# Patient Record
Sex: FEMALE | Race: WHITE | NOT HISPANIC OR LATINO | URBAN - METROPOLITAN AREA
[De-identification: names, ages, dates, MRNs, and addresses within clinical notes are randomized per-mention and may not be internally consistent; named-entity substitution may affect disease eponyms.]

---

## 2019-06-07 ENCOUNTER — OFFICE VISIT (OUTPATIENT)
Dept: INTEGRATIVE MEDICINE | Age: 54
End: 2019-06-07

## 2019-06-07 VITALS
SYSTOLIC BLOOD PRESSURE: 112 MMHG | TEMPERATURE: 98.1 F | HEIGHT: 69 IN | OXYGEN SATURATION: 97 % | HEART RATE: 66 BPM | RESPIRATION RATE: 20 BRPM | WEIGHT: 203.6 LBS | DIASTOLIC BLOOD PRESSURE: 72 MMHG | BODY MASS INDEX: 30.16 KG/M2

## 2019-06-07 DIAGNOSIS — J45.40 MODERATE PERSISTENT ASTHMA WITHOUT COMPLICATION: Primary | ICD-10-CM

## 2019-06-07 PROCEDURE — INTG102 PR NEW WELLNESS ASSESSMENT (60 MIN): Performed by: FAMILY MEDICINE

## 2019-06-07 ASSESSMENT — ENCOUNTER SYMPTOMS
CONSTIPATION: 1
SLEEP DISTURBANCE: 0
HALLUCINATIONS: 0
HEADACHES: 0
PHOTOPHOBIA: 0
DECREASED CONCENTRATION: 0
LIGHT-HEADEDNESS: 0
ALLERGIC/IMMUNOLOGIC NEGATIVE: 1
SEIZURES: 0
DIFFICULTY URINATING: 0
RHINORRHEA: 1
TREMORS: 0
BLOOD IN STOOL: 0
CHILLS: 0
DIARRHEA: 0
FACIAL SWELLING: 0
ARTHRALGIAS: 0
COLOR CHANGE: 0
COUGH: 1
SINUS PRESSURE: 1
CONFUSION: 0
FATIGUE: 0
DIZZINESS: 0
NUMBNESS: 0
EYE PAIN: 0
TROUBLE SWALLOWING: 0
RECTAL PAIN: 0
WHEEZING: 1
BACK PAIN: 0
ABDOMINAL DISTENTION: 1
DIAPHORESIS: 0
FLANK PAIN: 0
ADENOPATHY: 0
NAUSEA: 0
BRUISES/BLEEDS EASILY: 0
ANAL BLEEDING: 0
ABDOMINAL PAIN: 0
VOMITING: 0
FEVER: 0
DYSURIA: 0

## 2019-06-07 NOTE — PROGRESS NOTES
"[facility] Wellness Visit    Date: 2019    Name: Magaly Aranda    : 1965    Reason for visit: Wellness    Allergies: Patient has no known allergies.    Medications: albuterol inhaler, zyrtec prn, flonase NS prn    Supplements: Off most now      PLEASE SEE THE ATTACHED FAMILY HX, SOCIAL HISTORY, AND INTEGRATIVE HISTORY EVALUATIONS (MSQ, TIMELINE, MATRIX, OTHER HISTORY FORMS) IN PAPER FORM ATTACHED TO THE PATIENT’S CHART.      Family History:   Family History   Problem Relation Age of Onset   • Hypertension Mother    • Alzheimer's disease Mother    • Diabetes Father    • Hepatitis Father    • Hypertension Father          Social History:   Social History     Social History   • Marital status:      Spouse name: N/A   • Number of children: N/A   • Years of education: N/A     Social History Main Topics   • Smoking status: Former Smoker     Years: 10.00   • Smokeless tobacco: Never Used      Comment: QUIT    • Alcohol use Yes      Comment: MODERATE   • Drug use: No   • Sexual activity: Not Asked     Other Topics Concern   • None     Social History Narrative   • None        Experiences with CAM:[]      Personal History:[]        Lifestyle:[]      Diet and Nutrition:[]        Vitals: /72 (BP Location: Left upper arm, Patient Position: Sitting)   Pulse 66   Temp 36.7 °C (98.1 °F) (Oral)   Resp 20   Ht 1.753 m (5' 9\")   Wt 92.4 kg (203 lb 9.6 oz)   SpO2 97%   BMI 30.07 kg/m²     Weight: Weight: 92.4 kg (203 lb 9.6 oz)     Height: Height: 175.3 cm (5' 9\")     BMI:Body mass index is 30.07 kg/m².    MARY:     % Body Fat: 42.2    % Muscle Mass: 64.8    Visceral Fat:  20    WC: 37.25    NSR 62/min      HPI  For first visit in 2 - 3 years  Had pneumonia in February (+ CXR)  Recurrent sinus infections and Asthma    Working renovating houses to flip them  Weight up at 203, 209 7 years ago.     Fatigue, tired.  Sleep is poor, 3 AM up and it takes a while to go " back to sleep.  With the past business psycho-partner, a 1 1/2 year extremely stressful time that had her angry and fatigued all the time.  Got bad again after the deal was finally done, like a release of the pressure actually made her collapse.   Vacation last week and was great, and then after it was over, then sick right after.    Gets cold sx, then sinuses get bad, then in the chest.  Dry feeling. Albuterol used intermittently.    Saline nose spray.  Has the Flonase but it seems like she gets sick.  Not used prophyllactically    3 mo ago last Rx with ZPAK.  Now coming again[place]  Then the allergies came back.  Was off the Zyrtec.  Had the bad itch when stopped it after taking it for a year.    Congested right up when got home.  Roof and Basement, have had water damage in the past, old bldg, they completed renovated.     Sis and her still fight.  On a break from her house flipping now.     Trying Yoga.  Feet pains, new foot doc.  Right foot bunion, had 3 surgeries on the left foot, toe implant and bunion removal.  Trouble with prolonged walking.   No other exercise, gym lately.   Not exercising.  Tires her out more then she thinks, has to nap after the gym.      Zyrtec, claritin    Bowels also:  Magnesium, no constipation exc might if she would.  Carbs will cause constipation.  Brown rice, sweet potato.  Bloating and Gas.  No diarrhea.  Feet swollen and     For at least 10 - 20 years, would get bronchitis every august even as a kid, also had allergioes then.  Got worse with allergy shots.    Environmental, horses.  Not dogs?  Had Cats as an adult, had reactions.     Rice, Dairy cause bloating sometimes, tends to run constipated.  Has done HCG diet several times and always loses weight,  Vegetables no problem?  Diet dairy and higher fiber carbs, starchy.  Little bread, sourdough.  Pancake bothered her last week.  Soy and corn skinny pop bothers her. Did the diet in Jan and lost 17 lbs, gained back  14 now.    Off all HRT for 3 months, on Testosterone cream low dose for energy and mild clarity.  Brain fog got way worse but improving this month.  No night sweats.      Last period was in March stopped the HRT, then progesterone for another month.  Saw GYN, had an US with fibroid. Used to be regular before the HRT, PMS was a little.    PAP exam in last year.   Pellet testosterone low dose.      On BodyLog[employer] MD Stress formula has adrenal extract in it.   Melatonin and L theanine. One at night, not benadryl.  Magnesium at night.   Lyme was negative in the past.  Aching and fatigu often.  HA rare.  Wired, neck pain also.  Tight chest at times. Thighs get bad after exercise    Massage once in a while      Review of Systems   Constitutional: Negative for chills, diaphoresis, fatigue and fever.   HENT: Positive for congestion, postnasal drip, rhinorrhea and sinus pressure. Negative for dental problem, ear pain, facial swelling, hearing loss, mouth sores, nosebleeds, tinnitus and trouble swallowing.    Eyes: Negative for photophobia, pain and visual disturbance.   Respiratory: Positive for cough and wheezing (Intermittent with bad allergy, exposures or URI).    Cardiovascular: Positive for chest pain (tightness at times, not related to exertion) and leg swelling (Comes and goes with travel, or sometimes after eating carbs).   Gastrointestinal: Positive for abdominal distention (Bloats and gas bad after certain carbs) and constipation. Negative for abdominal pain, anal bleeding, blood in stool, diarrhea, nausea, rectal pain and vomiting.   Endocrine: Negative for cold intolerance and heat intolerance.   Genitourinary: Negative for difficulty urinating, dyspareunia, dysuria, enuresis, flank pain, menstrual problem, pelvic pain and vaginal bleeding.   Musculoskeletal: Positive for gait problem (Due to left in the past, and now right foot pain/bunion). Negative for arthralgias and back pain.   Skin: Negative for color  change, pallor and rash.   Allergic/Immunologic: Negative.    Neurological: Negative for dizziness, tremors, seizures, syncope, light-headedness, numbness and headaches.   Hematological: Negative for adenopathy. Does not bruise/bleed easily.   Psychiatric/Behavioral: Negative for confusion, decreased concentration, hallucinations and sleep disturbance.         Physical Exam   Constitutional: She is oriented to person, place, and time. She appears well-developed and well-nourished.   HENT:   Head: Normocephalic.   Right Ear: External ear normal.   Left Ear: External ear normal.   Nose: Nose normal.   Mouth/Throat: Uvula is midline and mucous membranes are normal. No oral lesions. No posterior oropharyngeal erythema.   Bad of throat slightly red and white post pharynx drainage   Eyes: Conjunctivae and EOM are normal.   Neck: Normal range of motion. No thyromegaly present.   Tight posterior cervical muscles.    Cardiovascular: Normal rate, regular rhythm, normal heart sounds and intact distal pulses.    No murmur heard.  Pulmonary/Chest: Effort normal and breath sounds normal. She has no rales.   Abdominal: Soft. Bowel sounds are normal. She exhibits no distension and no mass. There is no tenderness. There is no rebound.   Musculoskeletal: Normal range of motion. She exhibits no edema or deformity.   Lymphadenopathy:     She has no cervical adenopathy.   Neurological: She is alert and oriented to person, place, and time. No cranial nerve deficit.   Skin: Skin is warm and dry. No rash noted. No pallor.   Psychiatric: She has a normal mood and affect. Her speech is normal and behavior is normal. Judgment and thought content normal.         Other:    Labs:   MSQ Score: []    Exercise Questionnaire:[]    Nutrition Journal:[]    Self Care Questions:[]      ASSESSMENT: []  Menopause  Recurrent sinus infections  Environmental allergies  Asthma, moderate  Hx of pneumonia 10/2018  Fatigue  Insomnia  Overweight  Myalgias  Joint  and neck pains  Work exposure to dust, fumes, chemicals, paint, mold  HRT with testosterone pellets  Most likely dysbiosis with altered micro-biome and increased intestinal permeability due to multiple antibiotics, steroids, over the last 20 years.    Food intolerances due to the dysbiosis, rule out celiac.  Viral URI    PLAN:[]  Labs ordered, need to do full assessment of her hormonal systems, nutrients, autoimmune status, possible food allergies, also lipids, sugar, insulin, hemoglobin A1c.  We will do a full thyroid panel including iodine.  Check her ferritin and CBC, check CPK to rule out myopathy.  Mercury, lead, and arsenic due to occupational exposures.  HLA-DR and MMP 9 because of concerns about mold toxicity.  Assess rest of adrenal function with a renin and aldosterone due to weakness and lightheadedness with probable low blood pressure at times.  Elimination diet as an intervention, will begin 7 food elimination, reviewed the diet, food list given, other materials pointed out on her website, to do a 4-week elimination, which she is used to doing the hCG diet.  However we will do a formal rechallenge after 4 weeks, 2 days on and 2 days off each food group, materials given and reviewed including symptom chart.  Also consider low FODMAPs approach.    After the elimination phase, will begin a detox program with Lynd[employer] detox products and modified citrus pectin.  Consider [facility] lab urine toxin panel.    To download and do the visual contrast sensitivity test to screen for mold toxicity at her home.  We will discuss further work-up and evaluation based on HLA-DR and MMP 9 results.  Lyme and tickborne infection screening tests also ordered.  We will monitor weight, endurance, muscle and neck pains.  Encourage stretching, yoga, massage.    Follow-up with gynecologist and bioidentical hormone specialist.  Consider genomic testing for estrogen metabolism, consider nutritional means of increasing aromatase  activity now that she is menopausal, consider calcium glucurate.  Consider DHEA and pregnenolone use..     Discussed the altered micro-biome after multiple antibiotics for decades.  Discussed her food intolerances.  Stool tests for doctor's data comprehensive stool exam with parasitology x1 ordered.  Reviewed the specimen collection, and kit given.  Consider IgG4 to further evaluate possible foods that may trigger her allergy and asthma symptoms.  Consider new supplement package to include multivitamins, adaptive genic herbs, quercetin and herbs for allergy and asthma, and then the detoxification package from [employer].      Given Rx for ZPAK to use if sinus sx worsen  RTO:   [] We will call with results and schedule follow-up.

## 2019-06-26 DIAGNOSIS — Z77.120 CONTACT WITH MOLD: Primary | ICD-10-CM

## 2019-06-26 DIAGNOSIS — J45.40 MODERATE PERSISTENT ASTHMA WITHOUT COMPLICATION: ICD-10-CM

## 2019-06-26 RX ORDER — CHOLESTYRAMINE 4 G/9G
POWDER, FOR SUSPENSION ORAL
Qty: 60 PACKET | Refills: 6 | Status: SHIPPED | OUTPATIENT
Start: 2019-06-26 | End: 2019-06-26 | Stop reason: SDUPTHER

## 2019-06-26 NOTE — PROGRESS NOTES
Telephone call: Discussed with Samara and all of her lab results.  Highlights are below:    Vitamin D 28.7, started vitamin D drops 5000 units a day.    Testosterone level 107, DHEA reduced at 87, pregnenolone significantly reduced at 17, normal sex hormone binding globulin at 71, prolactin at 10.2, estradiol at 44.8, ALT postmenopausal.  Patient is on testosterone pellets, and dose was decreased when it was inserted in beginning of April.  She has a follow-up in July.  I recommend that she decrease the dose of testosterone again.  In the meantime, suggest she begin pregnenolone 50 mg 1 a day, brand names given.  Recheck levels in about 3 months.    CBC, CMP, normal, with hemoglobin A1c 5.3%, insulin 6.7.    ASHOK, ASHOK cascade, rheumatoid labs all negative.    Arsenic, mercury negative, lead level was 2.    Coenzyme Q 10, vitamin A, glutathione all normal, cortisol level was 6.3 at the bottom of normal.  CPK was normal at 60.  Copper was 99, with zinc of 74, with a relative suboptimal zinc level.  She will begin zinc at a 15 to 20 mg dose of over-the-counter zinc.    Lyme antibodies and C6 Lyme peptide, as well as all usual tickborne screening tests were negative.    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    Thyroid labs including reverse T3 were unremarkable.  Urine iodine was normal.  However, TPO antibodies are now elevated at 59.  Suggested that she begin thyroid synergy by designs for health, 2 a day.    Aldosterone to rennin ratio was normal.  Suggest that she decrease her In Body adrenal glandular supplement down to only 1 a day.  That also has Rhodiola in it at 100 mg dose.  The thyroid synergy has American ginseng.    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  MMP 9 was elevated at 463.  She had her basement which has had significant water damage, looked at, and she has significant mold problems.  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  Since it looks like she has susceptibility and signs of toxicity, she is going to proceed with doing a remediation by professional at home.  In the meantime, she may try cholestyramine powder, to do only one half of a packet in water twice a day about 30 to 45 minutes before lunch and dinner.  She will take all her other supplements around breakfast and in the evening.  Prescription called in.    To let me know in about 1 month how she has been doing with the supplements.  Plan on rechecking blood tests including an expanded mold profile in about 3 months.  She will proceed with a visual contrast sensitivity test now and let me know the results.  After remediation occurs in July, she should do home testing with a HERTSMI-2 about 1 month after completion of the project.    Dr. ZIMMERMAN

## 2019-06-27 RX ORDER — CHOLESTYRAMINE 4 G/9G
POWDER, FOR SUSPENSION ORAL
Qty: 180 PACKET | Refills: 3 | Status: SHIPPED | OUTPATIENT
Start: 2019-06-27 | End: 2020-11-09

## 2019-08-14 ENCOUNTER — OFFICE VISIT (OUTPATIENT)
Dept: INTEGRATIVE MEDICINE | Age: 54
End: 2019-08-14

## 2019-08-14 VITALS
TEMPERATURE: 98 F | HEART RATE: 67 BPM | HEIGHT: 69 IN | OXYGEN SATURATION: 98 % | WEIGHT: 202.6 LBS | SYSTOLIC BLOOD PRESSURE: 118 MMHG | RESPIRATION RATE: 18 BRPM | DIASTOLIC BLOOD PRESSURE: 64 MMHG | BODY MASS INDEX: 30.01 KG/M2

## 2019-08-14 DIAGNOSIS — J45.40 MODERATE PERSISTENT ASTHMA WITHOUT COMPLICATION: Primary | ICD-10-CM

## 2019-08-14 DIAGNOSIS — R10.84 GENERALIZED ABDOMINAL PAIN: ICD-10-CM

## 2019-08-14 PROCEDURE — INTG103 PR FOLLOW-UP CONSULT (60 MIN): Performed by: FAMILY MEDICINE

## 2019-08-14 PROCEDURE — INTG114 PR NEW CONSULT LAB PREP FEE: Performed by: FAMILY MEDICINE

## 2019-08-14 ASSESSMENT — ENCOUNTER SYMPTOMS
FATIGUE: 0
SHORTNESS OF BREATH: 0
ACTIVITY CHANGE: 0
RHINORRHEA: 1
HALLUCINATIONS: 0
COLOR CHANGE: 0
VOMITING: 0
CHEST TIGHTNESS: 0
PALPITATIONS: 0
SLEEP DISTURBANCE: 0
CONFUSION: 0
DIARRHEA: 1
FEVER: 0
HEMATURIA: 0
DIZZINESS: 0
HEADACHES: 1
APPETITE CHANGE: 0
BLOOD IN STOOL: 0
ADENOPATHY: 0
JOINT SWELLING: 0
UNEXPECTED WEIGHT CHANGE: 0
AGITATION: 0
ARTHRALGIAS: 0

## 2019-08-14 NOTE — PROGRESS NOTES
"[facility] Follow Up Visit    Date: 2019    Name: Magaly Aranda    : 1965    Reason for visit:     Allergies: Patient has no known allergies.    Medications:   Current Outpatient Prescriptions   Medication Sig Dispense Refill   • ASHWAGANDHA ROOT EXTRACT,BULK, MISC 1,950 mg. Physicians choice     • cholestyramine (QUESTRAN) 4 gram packet Take 1/2 a packet (2 gm) dissolved in 6 ounces of water about 30 - 45 minutes before lunch and dinner. 180 packet 3   • diphenhydramine HCl (SLEEP ORAL) Take by mouth. Sleep gummies. Natures bounty     • GINKGO BILOBA ORAL Take by mouth. Brand- havasu     • MAGNESIUM ORAL Take 500 mg by mouth. Finest nutrition.     • multivit-minerals/folic acid (ADULT MULTIVITAMIN GUMMIES ORAL) Take by mouth. Smarty pants womens complete     • NOT IN DATABASE Pure lift. Body logic md      • NOT IN DATABASE hsot defense, mushrooms. Cordyceps. Energy support     • NOT IN DATABASE Blood circulation. Thrive     • TURMERIC ORAL Take 500 mg by mouth. Nature made       No current facility-administered medications for this visit.        Supplements: [ ]    Family History:   Family History   Problem Relation Age of Onset   • Hypertension Mother    • Alzheimer's disease Mother    • Diabetes Father    • Hepatitis Father    • Hypertension Father          Social History:   Social History     Social History   • Marital status:      Spouse name: N/A   • Number of children: N/A   • Years of education: N/A     Social History Main Topics   • Smoking status: Former Smoker     Years: 10.00   • Smokeless tobacco: Never Used      Comment: QUIT    • Alcohol use Yes      Comment: MODERATE   • Drug use: No   • Sexual activity: Not Asked     Other Topics Concern   • None     Social History Narrative   • None        Vitals: /64 (BP Location: Left upper arm, Patient Position: Sitting)   Pulse 67   Temp 36.7 °C (98 °F) (Oral)   Resp 18   Ht 1.753 m (5' 9\") " "  Wt 91.9 kg (202 lb 9.6 oz)   SpO2 98%   BMI 29.92 kg/m²     Weight: Weight: 91.9 kg (202 lb 9.6 oz)     Height: Height: 175.3 cm (5' 9\")     BMI:Body mass index is 29.92 kg/m².    MARY: [ ]    % Body Fat: [ ]    % Muscle Mass: [ ]    Visceral fat:  []    WC: [ ]  Sinus Jose 57/min      HPI  Returns for Fu after initial bowel and mold treament, along with mold remediation at home. Wt down 2 lbs. Did elimination diet for 6 weeks, had bad member guest weekend, alcohol on weekends.     Gi fortify with almond milk.  Arabinogalactan in the pills.  Sacchromyces one bid.   Pancreatin and oxbile by vital nturients, gets bad burning, one with lunch and dinner.      Cholestyramine has to make herself do it.  Two out this morning.  One packet, once a day sometimes.    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      Cramping and loose for 4 - 5 days, then did for 1 - 2 days in a row.  Happened last week and 2 weeks ago.    Elimination:  staying off gluten, dairy, restarted this week.    No GERD (years ago only)  Occ constipation after carbs.      Sinus issues if around certain things, smell set her off.  Congestion.   Sneezing.  One day took zyrtec, took apart the sofa and was bad that day.  Going to sit.    Good stretch    Sleep is 1/2 night, wake up and gets back most of the time.    Fatigue is in the legs when goes up the steps.  Tired end of the day and feet hurt.  About the same, off and on.   Taking the naproxen one on days golfing. No second dose usually.      Feets a little less achy.  When very busy gets achy at end of the day.      Not working on a new house.     At home:  Tear up the basement, work completed, put up new wwall system.   Bedroom ceiling leaking into the roof and ceiling.  Their neighbors was rotten.  Blown in insulation.  Ripped it all out and drying out now.  Last week.  Tried not to be home.  Neighbors may fix theirs.     New slate system.  "     Abbe's room had leaking chimney.  Plaster ok but wood was roteen.  Sprayed with the mold deterent.     Failed one eye.      Did petri dish from outside mold,  .  Obvious mold on the wood.      Swelling in the morning when she gets up.    On testosterone replaacement.    Bloating has changed for the good.   Zuccini and bean pasta.    Green tea, not much coffee.  Staying off corn due to mold  Congested some sleeping in the loft.  Allergies usually.  Lot sof renovating still in progress.    Fuse is longer.  Trying to breath or meditate some, not over reacting.     Off the ashwagandha.  Stop ginko. Smarty pants MVI for women.  No iron.  Magnesium two at night.  No turmeric  Zinc at night  Host defense Mushroom  Benadryl not at night.   Sleep formula jelly Melatonin, and L theanine.  one at night.    5 htp and melatonin.      JJ Virgin diet.  Wondering about other food allergens.   Using the saline squirts up the nose.     Sleep   Easy fatigue  Aching  Weight    Mood improving.   Bowels improving    Has  HEPA vac.   Demudifier massive unit 54%  HEPA on the furnace, HVAC coing today.  Cleaned the ducts.  Disinfected the ducts.     Only wheezed a little after sleeping in the loft yesterday.       Review of Systems   Constitutional: Negative for activity change, appetite change, fatigue, fever and unexpected weight change.   HENT: Positive for rhinorrhea (Much less then usual this month).    Respiratory: Negative for chest tightness and shortness of breath.    Cardiovascular: Negative for chest pain, palpitations and leg swelling.   Gastrointestinal: Positive for diarrhea (Loose bm one to two times last week and this week.   constipation is gone, bloating is gone. ). Negative for blood in stool and vomiting.   Endocrine: Positive for polyuria.        Often feels swollen   Genitourinary: Negative for hematuria.   Musculoskeletal: Negative for arthralgias and joint swelling.   Skin: Negative for color change, pallor and  rash.   Allergic/Immunologic: Positive for environmental allergies and food allergies.   Neurological: Positive for headaches (improved now). Negative for dizziness and syncope.   Hematological: Negative for adenopathy.   Psychiatric/Behavioral: Negative for agitation, confusion, hallucinations and sleep disturbance.        Less angry, less irritable, more relaxed.          Physical Exam   Constitutional: She is oriented to person, place, and time. She appears well-developed and well-nourished. No distress.   HENT:   Head: Normocephalic.   Nose: Nose normal.   Mouth/Throat: Oropharynx is clear and moist.   Eyes: Conjunctivae and EOM are normal. No scleral icterus.   Neck: Normal range of motion. No thyromegaly present.   Cardiovascular: Normal rate, regular rhythm, normal heart sounds and intact distal pulses.    No murmur heard.  Pulmonary/Chest: Effort normal and breath sounds normal.   Lymphadenopathy:     She has no cervical adenopathy.   Neurological: She is alert and oriented to person, place, and time. No cranial nerve deficit.   Skin: Skin is warm and dry. No rash noted. No pallor.   Psychiatric: She has a normal mood and affect. Her behavior is normal.   Nursing note and vitals reviewed.        Other:    Labs:   MSQ Score: [ ]        ASSESSMENT: [ ]  1. Mold toxicity:  HLA genotype positive, MMP 9 was 453, VCS failed, Multiple areas with water damage and mold at home (basement, bedroom)    2. dysbiosis with depressed beneficial bacteria, low elastase, low sIgA, impaired butyrate (multiple antibiotics for years) with bloating and contstipation on carbs.  3. Improving on no grains exc rice diet, excluding high mold foods like corn, peanuts, coffee, gluten.  Restarting dairy, shellfish and eggs.  Off soy.   4. Obesity resistant, due to mold toxicity, dysbiosis, altered stress response  5. Hashimoto's thyroiditis, tpo 59  6. Testosterone replacement  7. Depressed dhea and pregnenolone, menopausal/mold  toxin  8. Environmental allergies and asthma, improved  9. Other food allergy?  10. Toe joint replacement  11. Recurrent sinusitis  12. FHx of alzheimer, Mild cognitive impairment  13. Vit D deficiency  14. Lower then ideal zinc.   15. Insomnia  16. HLA DQ2 gene positive, with negative celiac ab screen  17. Altered suppressed cortisol circadian pattern last year on ZRT 4 pt saliva test.   18. Menopause  19. 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 Aching. Fatigue, mood swings, obesity due to mold toxins    PLAN:[ ]  1. Continue to complete the renovation at home, getting furnace cleaned out.  Doing all belongings, guidelines given  2. Check for MARCONs now, culture taken today, for Fungi also  3. Check Allergix 87 today for other food reactions  4. Continue the bowel protocol.  GI fortify, arabinogalactan (DC if increased loose BM's, sacchromyces b, digestive enzymes and ox bile (no HCL).    5. Continue regular activity, golf.   6. Continue to take more time for relaxation.  Consdier breathwork, yoga, meditaion, heartmath.   7. Remain off gluten and GF grains exc rice.  Remain off corn, peanuts and coffee.  Doing green tea.   OK to reintroduce the shellfish, eggs and dairy for now.   8. Continue the Zinc 30 mg, Vit D, MVI, Thyroid synergy 2 a day  9. Recheck VCS online in 2 weeks to track progress  10. In one month, check rest of the Mold markers, full thyroid panel, dhea and pregnenolone plus testosterone 9adjust dose?)  11. Treat MACONs if positive.   12. Do HERTSMI-2 in 1 mo after completion of renovations/repairs      RTO:   [ ] 2 months, redo MSQ and MARY

## 2019-08-21 NOTE — PROGRESS NOTES
Started ZACH nosespray for RUDY, ordering at [facility] in [place].  2 squirts each nostril bid.  Can use with one squirt of Xlear OTC .  uSE FOR one month.  Recheck markers in one month.

## 2019-09-16 ENCOUNTER — TELEPHONE (OUTPATIENT)
Dept: INTEGRATIVE MEDICINE | Age: 54
End: 2019-09-16

## 2019-11-01 LAB
25(OH)D3+25(OH)D2 SERPL-MCNC: 32.7 NG/ML (ref 30–100)
CRP SERPL-MCNC: 1 MG/L (ref 0–10)
DHEA-S SERPL-MCNC: 71.5 UG/DL (ref 41.2–243.7)
OSMOLALITY SERPL: 289 MOSMOL/KG (ref 275–295)
T3 SERPL-MCNC: 105 NG/DL (ref 71–180)
T3FREE SERPL-MCNC: 3.1 PG/ML (ref 2–4.4)
T4 FREE SERPL-MCNC: 1.4 NG/DL (ref 0.82–1.77)
TESTOST FREE SERPL-MCNC: 2 PG/ML (ref 0–4.2)
TESTOST SERPL-MCNC: 99 NG/DL (ref 3–41)
THYROPEROXIDASE AB SERPL-ACNC: 50 IU/ML (ref 0–34)
TSH SERPL DL<=0.005 MIU/L-ACNC: 1.36 UIU/ML (ref 0.45–4.5)

## 2019-11-02 LAB — ZINC SERPL-MCNC: 143 UG/DL (ref 56–134)

## 2019-11-04 LAB
PREG SERPL-MCNC: 29 NG/DL
T3REVERSE SERPL-MCNC: 16.8 NG/DL (ref 9.2–24.1)

## 2019-11-07 LAB
A-MSH SERPL-MCNC: <8 PG/ML (ref 0–40)
ACTH PLAS-MCNC: 20.1 PG/ML (ref 7.2–63.3)
LAB CORP TRANS. GROWTH FACT. BETA 1: 2801 PG/ML (ref 867–6662)
LEPTIN SERPL-MCNC: 32.7 NG/ML
VASOPRESSIN SERPL-MCNC: 0.8 PG/ML (ref 0–4.7)
VEGF SERPL-MCNC: 34 PG/ML (ref 0–115)

## 2019-11-09 LAB — MMP9 SER-MCNC: 373 NG/ML

## 2019-12-06 NOTE — PROGRESS NOTES
[facility] Follow Up Visit    Date: 2019    Name: Magaly Aranda    : 1965    Reason for visit:     Allergies: Patient has no known allergies.    Medications:   Current Outpatient Medications   Medication Sig Dispense Refill   • cholestyramine (QUESTRAN) 4 gram packet Take 1/2 a packet (2 gm) dissolved in 6 ounces of water about 30 - 45 minutes before lunch and dinner. 180 packet 3   • diphenhydramine HCl (SLEEP ORAL) Take by mouth. Sleep gummies. Natures bounty     • HERBAL DRUGS ORAL Take by mouth. arabingolactn     • MAGNESIUM ORAL Take 500 mg by mouth. Finest nutrition.     • multivit-minerals/folic acid (ADULT MULTIVITAMIN GUMMIES ORAL) Take by mouth. Smarty pants womens complete     • NOT IN DATABASE Pure lift. Body logic md      • NOT IN DATABASE hsot defense, mushrooms. Cordyceps. Energy support     • TURMERIC ORAL Take 500 mg by mouth. Nature made     • ZINC ORAL Take by mouth.     • ASHWAGANDHA ROOT EXTRACT,BULK, MISC 1,950 mg. Physicians choice     • GINKGO BILOBA ORAL Take by mouth. Brand- havasu     • NOT IN DATABASE Blood circulation. Thrive       No current facility-administered medications for this visit.        Supplements: [ ]    Family History:   Family History   Problem Relation Age of Onset   • Hypertension Biological Mother    • Alzheimer's disease Biological Mother    • Diabetes Biological Father    • Hepatitis Biological Father    • Hypertension Biological Father          Social History:   Social History     Socioeconomic History   • Marital status:      Spouse name: None   • Number of children: None   • Years of education: None   • Highest education level: None   Occupational History   • None   Social Needs   • Financial resource strain: None   • Food insecurity:     Worry: None     Inability: None   • Transportation needs:     Medical: None     Non-medical: None   Tobacco Use   • Smoking status: Former Smoker     Years: 10.00   •  "Smokeless tobacco: Never Used   • Tobacco comment: QUIT 2009   Substance and Sexual Activity   • Alcohol use: Yes     Comment: MODERATE   • Drug use: No   • Sexual activity: None   Lifestyle   • Physical activity:     Days per week: None     Minutes per session: None   • Stress: None   Relationships   • Social connections:     Talks on phone: None     Gets together: None     Attends Zoroastrianism service: None     Active member of club or organization: None     Attends meetings of clubs or organizations: None     Relationship status: None   • Intimate partner violence:     Fear of current or ex partner: None     Emotionally abused: None     Physically abused: None     Forced sexual activity: None   Other Topics Concern   • None   Social History Narrative   • None        Vitals:   Visit Vitals  BP 98/60 (BP Location: Left upper arm, Patient Position: Sitting)   Pulse (!) 55   Temp 36.6 °C (97.8 °F) (Oral)   Resp 16   Ht 1.74 m (5' 8.5\")   Wt 93.4 kg (206 lb)   SpO2 96%   BMI 30.87 kg/m²       Weight: Weight: 93.4 kg (206 lb)     Height: Height: 174 cm (5' 8.5\")     BMI:Body mass index is 30.87 kg/m².    MARY:     % Body Fat: 43.7    % Muscle Mass: 64.2    Visceral fat:  20    WC: [ ]    NSR: 57, sinus bradycardia      HPI  For return visit afte 8/14 visit, and treatment of MARCONS AND MOLD IN THE MEANTIME.     Wt up 4 lbs  Not panicking this weak, mood is more stale.  Not the dizzy feeling.  No skipped beats now.     Once caught the skipped beats her whole life since the 20's, on the Apple watch about 3 weeks ago. Skipped beats but not fast.     Nose stuffy with certain exposures, like paint.   More aware of response things now cause not in it every day like she was.      testosterone pellets put in July 2019, Jan to reinsert every 6 months. Lower dose.    Another lab at that time. To do   Sat no stress.     MVI six gummies daily, smarty pants  Memory fluctuating.  Celery juicing 1 week    1 " rhodiola  arabinogalactan  sacchromyces yamilka :  Probiotic at home  Gi fortify powder  Panreatin ox bile one with a meal.  Went away but took two weeks to get better  Took some pepto bismol  Ate different.    Swallows OK.  Some allergy sx, as a teen had easy choking possibly from allergy, some trouble with pills.  And no regular allergy pills.   4- 6 weeks.  Xlear  Cholestyramine one packet a day    Bowels most days, no bad constipation.  When changed magnesium.   Finest nutrition 500 mg  Mushrooms,   Out of tranquil sleep , occ some restlessness.  Not up for hours now. No melatonin.    Cut back on animal protein.  Dairy is Ok with cheese.  occ blue cheese dressing.  Not too bad.  Only occ bread.     Egg today first day. Only twice in the last  Foot shot helped 2 months ago.  Sore some again.   Left is the worst.   Left had a joint replacement.  Swelling is better.     Helps people with the houses.   Breathwork some.    Thyroid synergy 2 with food at lunch  Shake in the morning    Pain around the neck and the left ear, stayed in due to the pain?  One day then relieved.    Legs are tight and feel fatigue, not SOB.        Hot flashes  4/2019 LMP  Estrogen replacement was every 2 week periods, and then stopped the progesterone also .          Review of Systems        Physical Exam   Constitutional: She is oriented to person, place, and time. She appears well-developed and well-nourished. No distress.   HENT:   Head: Normocephalic.   Mouth/Throat: Oropharynx is clear and moist.   Eyes: Conjunctivae and EOM are normal.   Neck: Normal range of motion. No thyromegaly present.   Pulmonary/Chest: Effort normal and breath sounds normal. She has no wheezes.   Abdominal: Soft. Bowel sounds are normal.   Small umbilical hernia   Musculoskeletal: She exhibits tenderness (Tight tender points of the anterior and post neck muscles and traps. ). She exhibits no edema.   Lymphadenopathy:     She has no cervical adenopathy.    Neurological: She is alert and oriented to person, place, and time. No cranial nerve deficit.   Skin: Skin is warm and dry. No rash noted. No pallor.   Psychiatric: She has a normal mood and affect. Her behavior is normal.   Nursing note and vitals reviewed.        Other:    Labs:  pergnenolone    MSQ Score: [ ]    MoCA  Score: [ ]    ASSESSMENT:  1. Mold toxicity:  HLA genotype positive, MMP 9 was 453, VCS failed, Multiple areas with water damage and mold at home (basement, bedroom)  .  Now MMP9 down to 373, TGF B1 Nl.    2. Positive MARCONS:  S/p 6 weeks of treatment with BEG nose spray with Xlear.    3. dysbiosis with depressed beneficial bacteria, low elastase, low sIgA, impaired butyrate (multiple antibiotics for years) with bloating and contstipation on carbs, s/p 3 months of treatment with GI fortify, arabinogalactan, probiotics.    4. Improving on no grains exc rice diet,   excluding high mold foods like corn, peanuts, coffee, gluten.  Restarting dairy, shellfish and eggs.  Off soy.  Planning on reintro of some whole grains as part of Longevity diet  5. Obesity resistant, due to mold toxicity, dysbiosis, altered stress response  6. Hashimoto's thyroiditis, tpo 59 decreased to 50 with NL thyroid functions 10/31/2019  7. Testosterone replacement, level at 99 for retesting and reinsertion of pellet in Jan.  8. Depressed dhea and pregnenolone, menopausal/mold toxin  9. Environmental allergies and asthma, improved  10. Other food allergy?  11. Toe joint replacement, recurrent pain in the left and right feet  12. Recurrent sinusitis has improved and allergies have also decreased significantly  13. FHx of alzheimer, Mild cognitive impairment, more intermittent sx since mold cleanup  14. Vit D deficiency  15. Lower then ideal zinc, improved on thyroid synergy  16. Insomnia and anxiety improved  17. HLA DQ2 gene positive, with negative celiac ab screen  18. Altered suppressed cortisol circadian pattern last year on  ZRT 4 pt saliva test.   19. Menopause LMP April 2019.    20. 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 Aching. Fatigue, mood swings, obesity due to mold toxins  22. Palpitations for 20 years, most likely PAC's, no runs of tachycardia, just skipped beat feeling, last about a month ago.   23. Small umbilical hernia      PLAN:  1. [ ]Mood, anxiety, insomnia have improved  2. Weight is unchanged  3. 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   4. No menopause sx.    5. Improvement in the biomarkers for mold toxin illness.    6. Plan now that renovations are completed at home, is to wait one month and then do the VCS and the HERTSMI-2 at home.    7. Recheck RUDY today:  If present, retreat.   8. If MArCONS NEGATIVE, vcs improved, and Herstmi-2 better, and does the celery juicing and FMD diet for the next three months, and Not Better with fatigue and brain fog, wouldconsider treatment with VIP nose spray  9. Recheck biomarkers in 3 months also.  10. Will start the Fasting Mimicking Diet of CINTHYA Christine, will order the 5 day kits, fasting program 5 days a month for 3 months in a row , then every 3 - 4 months.   11. Also start inflammation resolvers:  Spm Active by [employer], 2  DAY AND THE tRANSFER fACTOR eNviro, 1 a day for 1 week then 2 a day for 3 months total.   12. Reviewed her supplements:  Cont the arabinogalactan, thyroid synergy, Xlear nose spray.    13. See list of supplements in hand out.   14. Restart strength training program. Adequate hydration  15. She is reintroducing eggs.  Also can reintro Sadaf (were positive on Allergix testing).  Doing cheese also as only dairy.   16. FU with GYN for the Teststerone dosing in Jan.        RTO:   [ ] 3 mo

## 2019-12-09 ENCOUNTER — OFFICE VISIT (OUTPATIENT)
Dept: INTEGRATIVE MEDICINE | Age: 54
End: 2019-12-09

## 2019-12-09 VITALS
BODY MASS INDEX: 30.51 KG/M2 | HEIGHT: 69 IN | TEMPERATURE: 97.8 F | RESPIRATION RATE: 16 BRPM | HEART RATE: 55 BPM | DIASTOLIC BLOOD PRESSURE: 60 MMHG | WEIGHT: 206 LBS | OXYGEN SATURATION: 96 % | SYSTOLIC BLOOD PRESSURE: 98 MMHG

## 2019-12-09 DIAGNOSIS — E06.3 HASHIMOTO'S THYROIDITIS: ICD-10-CM

## 2019-12-09 DIAGNOSIS — Z77.120 CONTACT WITH MOLD: Primary | ICD-10-CM

## 2019-12-09 DIAGNOSIS — E66.9 OBESITY (BMI 30-39.9): ICD-10-CM

## 2019-12-09 PROCEDURE — INTG103 PR FOLLOW-UP CONSULT (60 MIN): Performed by: FAMILY MEDICINE

## 2019-12-18 RX ORDER — AZITHROMYCIN 250 MG/1
TABLET, FILM COATED ORAL
Qty: 6 TABLET | Refills: 0 | Status: SHIPPED | OUTPATIENT
Start: 2019-12-18 | End: 2020-03-19

## 2019-12-19 ENCOUNTER — TELEPHONE (OUTPATIENT)
Dept: INTEGRATIVE MEDICINE | Age: 54
End: 2019-12-19

## 2019-12-19 NOTE — TELEPHONE ENCOUNTER
A envelope was received in the mail, a package search request, it is a cut out address label from our address and microbiology. I figured out it was from lynn baldwin label. She never walked into the postal service to pay for shipping she just dropped it in the box. There is no way for me to find this package because there is no tracking number and no number to call. I filled out a missing package on line.    Lynn is aware and she is sick now, will call me when she feels better to come back in for re swab

## 2019-12-23 RX ORDER — ALBUTEROL SULFATE 90 UG/1
2 INHALANT RESPIRATORY (INHALATION) EVERY 6 HOURS PRN
Qty: 1 INHALER | Refills: 11 | Status: SHIPPED | OUTPATIENT
Start: 2019-12-23 | End: 2020-03-20 | Stop reason: SDUPTHER

## 2020-01-02 ENCOUNTER — CLINICAL SUPPORT (OUTPATIENT)
Dept: INTEGRATIVE MEDICINE | Age: 55
End: 2020-01-02

## 2020-01-02 DIAGNOSIS — Z77.120 CONTACT WITH MOLD: ICD-10-CM

## 2020-01-02 PROCEDURE — 99999 PR OFFICE/OUTPT VISIT,PROCEDURE ONLY: CPT | Performed by: FAMILY MEDICINE

## 2020-01-02 NOTE — PROGRESS NOTES
Re-Collection of MARCONS nasal swab. Patient did not pay for postage for her last swab and her package was lost.

## 2020-01-08 ENCOUNTER — TELEPHONE (OUTPATIENT)
Dept: INTEGRATIVE MEDICINE | Age: 55
End: 2020-01-08

## 2020-01-08 NOTE — TELEPHONE ENCOUNTER
----- Message from Magaly Aranda sent at 1/7/2020 10:19 PM EST -----  Regarding: Prescription Question  Contact: [phone]  I have a question about LABORATORY resulted on 1/7/20, 6:25 PM.    Yes I need a prescription.  Thanks.  Too bad I still have this.

## 2020-01-08 NOTE — TELEPHONE ENCOUNTER
DMITRY for patient called in BEG nasal spray to [facility]. Also what is her questions RE the laboratory, that is her marcons that does not get interfaced into the portal. If she would like a copy we can mail e-mail or fax.

## 2020-03-19 NOTE — PROGRESS NOTES
TC    Some increased chest tightness after a cold, will restart QVAR inhaler 1 - 2 p bid for 2 weeks, call if increased sx.

## 2020-03-20 RX ORDER — ALBUTEROL SULFATE 90 UG/1
2 INHALANT RESPIRATORY (INHALATION) EVERY 6 HOURS PRN
Qty: 3 INHALER | Refills: 3 | Status: SHIPPED | OUTPATIENT
Start: 2020-03-20 | End: 2021-11-18 | Stop reason: SDUPTHER

## 2020-05-05 ENCOUNTER — DOCUMENTATION (OUTPATIENT)
Dept: INTEGRATIVE MEDICINE | Age: 55
End: 2020-05-05

## 2020-05-05 NOTE — PROGRESS NOTES
ORDERED GLX TOX WITH GLYPHOSATE, THROUGH [facility], TO BE SHIPPED TO PATIENT

## 2020-06-12 LAB
25(OH)D3+25(OH)D2 SERPL-MCNC: 50.3 NG/ML (ref 30–100)
ALBUMIN SERPL-MCNC: 4.5 G/DL (ref 3.8–4.9)
ALBUMIN/GLOB SERPL: 2.1 {RATIO} (ref 1.2–2.2)
ALP SERPL-CCNC: 98 IU/L (ref 39–117)
ALT SERPL-CCNC: 22 IU/L (ref 0–32)
AST SERPL-CCNC: 22 IU/L (ref 0–40)
BASOPHILS # BLD AUTO: 0 X10E3/UL (ref 0–0.2)
BASOPHILS NFR BLD AUTO: 1 %
BILIRUB SERPL-MCNC: 0.4 MG/DL (ref 0–1.2)
BUN SERPL-MCNC: 16 MG/DL (ref 6–24)
BUN/CREAT SERPL: 28 (ref 9–23)
CALCIUM SERPL-MCNC: 9.4 MG/DL (ref 8.7–10.2)
CHLORIDE SERPL-SCNC: 103 MMOL/L (ref 96–106)
CO2 SERPL-SCNC: 25 MMOL/L (ref 20–29)
CREAT SERPL-MCNC: 0.57 MG/DL (ref 0.57–1)
CRP SERPL-MCNC: <1 MG/L (ref 0–10)
DHEA-S SERPL-MCNC: 89.5 UG/DL (ref 41.2–243.7)
EOSINOPHIL # BLD AUTO: 0.1 X10E3/UL (ref 0–0.4)
EOSINOPHIL NFR BLD AUTO: 2 %
ERYTHROCYTE [DISTWIDTH] IN BLOOD BY AUTOMATED COUNT: 12.2 % (ref 11.7–15.4)
ESTRADIOL SERPL-MCNC: <5 PG/ML
FERRITIN SERPL-MCNC: 102 NG/ML (ref 15–150)
FOLATE SERPL-MCNC: 16.4 NG/ML
GLOBULIN SER CALC-MCNC: 2.1 G/DL (ref 1.5–4.5)
GLUCOSE SERPL-MCNC: 94 MG/DL (ref 65–99)
HBA1C MFR BLD: 5.2 % (ref 4.8–5.6)
HCT VFR BLD AUTO: 40.4 % (ref 34–46.6)
HGB BLD-MCNC: 13.7 G/DL (ref 11.1–15.9)
IMM GRANULOCYTES # BLD AUTO: 0 X10E3/UL (ref 0–0.1)
IMM GRANULOCYTES NFR BLD AUTO: 0 %
LAB CORP EGFR IF AFRICN AM: 123 ML/MIN/1.73
LAB CORP EGFR IF NONAFRICN AM: 107 ML/MIN/1.73
LYMPHOCYTES # BLD AUTO: 1.9 X10E3/UL (ref 0.7–3.1)
LYMPHOCYTES NFR BLD AUTO: 36 %
MCH RBC QN AUTO: 31.4 PG (ref 26.6–33)
MCHC RBC AUTO-ENTMCNC: 33.9 G/DL (ref 31.5–35.7)
MCV RBC AUTO: 93 FL (ref 79–97)
MONOCYTES # BLD AUTO: 0.4 X10E3/UL (ref 0.1–0.9)
MONOCYTES NFR BLD AUTO: 8 %
NEUTROPHILS # BLD AUTO: 2.8 X10E3/UL (ref 1.4–7)
NEUTROPHILS NFR BLD AUTO: 53 %
PLATELET # BLD AUTO: 314 X10E3/UL (ref 150–450)
POTASSIUM SERPL-SCNC: 4.5 MMOL/L (ref 3.5–5.2)
PROT SERPL-MCNC: 6.6 G/DL (ref 6–8.5)
RBC # BLD AUTO: 4.36 X10E6/UL (ref 3.77–5.28)
SARS-COV-2 IGG SERPL QL IA: NEGATIVE
SODIUM SERPL-SCNC: 140 MMOL/L (ref 134–144)
T3 SERPL-MCNC: 113 NG/DL (ref 71–180)
T3FREE SERPL-MCNC: 3.4 PG/ML (ref 2–4.4)
T4 FREE SERPL-MCNC: 1.3 NG/DL (ref 0.82–1.77)
TESTOST SERPL-MCNC: 193 NG/DL (ref 3–41)
THYROGLOB AB SERPL-ACNC: <1 IU/ML (ref 0–0.9)
THYROPEROXIDASE AB SERPL-ACNC: 30 IU/ML (ref 0–34)
TSH SERPL DL<=0.005 MIU/L-ACNC: 1.53 UIU/ML (ref 0.45–4.5)
VIT B12 SERPL-MCNC: 718 PG/ML (ref 232–1245)
WBC # BLD AUTO: 5.3 X10E3/UL (ref 3.4–10.8)

## 2020-06-16 LAB
COPPER SERPL-MCNC: 113 UG/DL (ref 72–166)
T3REVERSE SERPL-MCNC: 10.5 NG/DL (ref 9.2–24.1)
ZINC SERPL-MCNC: 123 UG/DL (ref 56–134)

## 2020-06-17 LAB
A-MSH SERPL-MCNC: 8 PG/ML (ref 0–40)
ACTH PLAS-MCNC: 30.6 PG/ML (ref 7.2–63.3)
LEPTIN SERPL-MCNC: 27.4 NG/ML
VASOPRESSIN SERPL-MCNC: 1 PG/ML (ref 0–4.7)
VEGF SERPL-MCNC: 29 PG/ML (ref 0–115)

## 2020-06-18 LAB
LAB CORP TRANS. GROWTH FACT. BETA 1: 1954 PG/ML (ref 867–6662)
MMP9 SER-MCNC: 464 NG/ML
PREG SERPL-MCNC: 13 NG/DL

## 2020-06-23 ENCOUNTER — TELEPHONE (OUTPATIENT)
Dept: INTEGRATIVE MEDICINE | Age: 55
End: 2020-06-23

## 2020-06-23 NOTE — TELEPHONE ENCOUNTER
TC    Reviewed her multiple labs:    MMP 9 stayed about the same at 464  TGF-beta 1 down around 1900 which is normal  MSH came up from undetectable to 8  Increases in ACTH and ADH noted.  Leptin decreased from 32 down to 27  VCS was a fail but only in the right eye  HERTSMI-2 was only 6    It appears the major renovations and remodeling at home may have eliminated her mold problem.  Also she has been working at her stepsons new apartment, and it is a new building without any signs of water damage.    G PL tox screen revealed significant elevations of Glyphosate, 2, 4-D, organophosphates, Styrene, acrylamide and Acrolein.  [place], so the possibility of runoff contaminating the water needs to be entertained.    Should consider a reverse osmosis filter  Eat only organic whenever possible  Eat only grass fed beef.    Since her major concern is inability to lose weight, fatigue, cognitive difficulties with a strong family history of dementia, then we need to take environmental toxins seriously.    She has been using the BEG spray again for the Marcon's infection this week without any pain or discomfort.  She is going to use that up.    6 mo ago lost 13 lbs noted while at podiatrist office in Oct  Then increased some weight, did a short stint of low calorie eating, and is stable this month    New IR sauna at home  Blancainga at home also will be exercising.    Acrylamide was high, information sheet will be sent.  Discussed different ways that we can improve her detoxification that may help with lingering mold and bacterial toxins as well as the environmental toxins.  She will begin a program for the next 6 months to include  Detox :  Mediclear plus powder, 2 scoops a day  Fibermend 2 scoops a day  Lyndsay heavy Metal pills, 2 a day  S boulardii probiotic, to once a day  NAC 1000 mg once a day    BEG nose spray running out. To change to EDTA only 0.2% from [facility]  in [place] when she runs out, 2 squirts 3 times daily for 6 months.    RTO 2 mo for MARY and consider VIP which may be necessary to totally reverse the chronic inflammatory response syndrome.    Consider neuro quant with brainstem readings and hippocampus or neuroreader    Consider HRT, on Testosterone pellet only.  LMP 1 year ago 4/2019    For foot laser treatment tomorrow in [facility].     RPD

## 2020-08-03 ENCOUNTER — TELEMEDICINE (OUTPATIENT)
Dept: INTEGRATIVE MEDICINE | Age: 55
End: 2020-08-03

## 2020-08-03 VITALS — WEIGHT: 206 LBS | BODY MASS INDEX: 30.51 KG/M2 | HEIGHT: 69 IN

## 2020-08-03 DIAGNOSIS — J45.40 MODERATE PERSISTENT ASTHMA WITHOUT COMPLICATION: ICD-10-CM

## 2020-08-03 DIAGNOSIS — E66.9 OBESITY (BMI 30-39.9): ICD-10-CM

## 2020-08-03 DIAGNOSIS — Z77.120 CONTACT WITH MOLD: Primary | ICD-10-CM

## 2020-08-03 DIAGNOSIS — E06.3 HASHIMOTO'S THYROIDITIS: ICD-10-CM

## 2020-08-03 DIAGNOSIS — M25.572 ARTHRALGIA OF BOTH ANKLES: ICD-10-CM

## 2020-08-03 DIAGNOSIS — M25.571 ARTHRALGIA OF BOTH ANKLES: ICD-10-CM

## 2020-08-03 DIAGNOSIS — D89.89 AUTOIMMUNE DISORDER (CMS/HCC): ICD-10-CM

## 2020-08-03 DIAGNOSIS — M79.10 MYALGIA: ICD-10-CM

## 2020-08-03 PROCEDURE — INTG103 PR FOLLOW-UP CONSULT (60 MIN): Mod: 95 | Performed by: FAMILY MEDICINE

## 2020-08-03 RX ORDER — LIOTHYRONINE SODIUM 5 UG/1
5 TABLET ORAL DAILY
Qty: 60 TABLET | Refills: 6 | Status: SHIPPED | OUTPATIENT
Start: 2020-08-03 | End: 2021-11-12 | Stop reason: ALTCHOICE

## 2020-08-03 NOTE — PROGRESS NOTES
Verification of Patient Location:  The patient affirms they are currently located in the following state:[place]     Request for Consent:  You and I are about to have a telemedicine check-in or visit. This is allowed because you are already my patient, and you have requested it. Before starting our telemedicine visit, I am required to get your consent for this virtual check-in or visit by telemedicine. Do you consent?      Patient Response to Request for Consent: Yes    The following have been reviewed and updated as appropriate in this visit:  Tobacco  Allergies  Meds  Problems  Med Hx  Surg Hx  Fam Hx  Soc Hx          Visit Documentation:  60 minutes visit      Time Spent in Medical Discussion During This Encounter:  [facility] Follow Up Visit    Date: 8/3/2020    Name: Magaly Aranda    : 1965    Reason for visit:     Allergies: Patient has no known allergies.    Medications:   Current Outpatient Medications   Medication Sig Dispense Refill   • albuterol HFA (VENTOLIN HFA) 90 mcg/actuation inhaler Inhale 2 puffs every 6 (six) hours as needed for wheezing. 3 Inhaler 3   • ASHWAGANDHA ROOT EXTRACT,BULK, MISC 1,950 mg. Physicians choice     • budesonide (PULMICORT FLEXHALER) 90 mcg/actuation inhaler Inhale 1 puff 2 (two) times a day. Rinse mouth with water after use to reduce aftertaste and incidence of candidiasis. Do not swallow.  Stop the QVAR inhaler. 3 Inhaler 3   • cholecalciferol, vitamin D3, (VITAMIN D3 ORAL) Take 10,000 Units by mouth.     • cholestyramine (QUESTRAN) 4 gram packet Take 1/2 a packet (2 gm) dissolved in 6 ounces of water about 30 - 45 minutes before lunch and dinner. 180 packet 3   • diphenhydramine HCl (SLEEP ORAL) Take by mouth. Sleep gummies. Natures bounty     • HERBAL DRUGS ORAL Take by mouth. arabingolactn     • HERBAL DRUGS ORAL Take by mouth. Interface     • HERBAL DRUGS ORAL Take by mouth. Trans Factor Enviro     • HERBAL  DRUGS ORAL Take by mouth. Pancreatin Ox Bile     • HERBAL DRUGS ORAL Take by mouth. Thyroid synergy     • Lactobacillus acidophilus (PROBIOTIC ORAL) Take by mouth. MegaSpore probiotic     • MAGNESIUM ORAL Take 500 mg by mouth. Finest nutrition.     • multivit-minerals/folic acid (ADULT MULTIVITAMIN GUMMIES ORAL) Take by mouth. Smarty pants womens complete     • NOT IN DATABASE hsot defense, mushrooms. Cordyceps. Energy support     • PREGNENOLONE, BULK, MISC      • TURMERIC ORAL Take 500 mg by mouth. Nature made     • ZINC ORAL Take by mouth.     • GINKGO BILOBA ORAL Take by mouth. Brand- havasu     • NOT IN DATABASE Pure lift. Body logic md      • NOT IN DATABASE Blood circulation. Thrive       No current facility-administered medications for this visit.        Supplements: [ ]    Family History:   Family History   Problem Relation Age of Onset   • Hypertension Biological Mother    • Alzheimer's disease Biological Mother    • Diabetes Biological Father    • Hepatitis Biological Father    • Hypertension Biological Father          Social History:   Social History     Socioeconomic History   • Marital status:      Spouse name: None   • Number of children: None   • Years of education: None   • Highest education level: None   Occupational History   • None   Social Needs   • Financial resource strain: None   • Food insecurity:     Worry: None     Inability: None   • Transportation needs:     Medical: None     Non-medical: None   Tobacco Use   • Smoking status: Former Smoker     Years: 10.00   • Smokeless tobacco: Never Used   • Tobacco comment: QUIT 2009   Substance and Sexual Activity   • Alcohol use: Yes     Comment: MODERATE   • Drug use: No   • Sexual activity: None   Lifestyle   • Physical activity:     Days per week: None     Minutes per session: None   • Stress: None   Relationships   • Social connections:     Talks on phone: None     Gets together: None     Attends [employer] service: None     Active member  "of club or organization: None     Attends meetings of clubs or organizations: None     Relationship status: None   • Intimate partner violence:     Fear of current or ex partner: None     Emotionally abused: None     Physically abused: None     Forced sexual activity: None   Other Topics Concern   • None   Social History Narrative   • None        Vitals:   Visit Vitals  Ht 1.74 m (5' 8.5\")   Wt 93.4 kg (206 lb)   BMI 30.87 kg/m²       Weight: Weight: 93.4 kg (206 lb)     Height: Height: 174 cm (5' 8.5\")     BMI:Body mass index is 30.87 kg/m².    MARY: [ ]    % Body Fat: [ ]    % Muscle Mass: [ ]    Visceral fat:  []    WC: [ ]    NSR:       HPI  For revisit after 6/23 phone call  Increased pain in the feet  HAD prp INJECTIONS IN maSS    Cryo on the left food previous joint replacement for the neuroma  PRP in the great toe on both feet  US of the feet and saw   Now 4 1/2 weeks 6/25    Bone spur under the artificial joint winter of 2015 [place] done again the next year Feb 2016.    Nothing improved, told 4 - 6 weeks. Unable to play golf.    Last 2 months new pain on the top.  Getting up it is stiff and sore.    Massage gun on the calves, and gets nerve pain into the feet.  Both sides.   Burning not numb it just hurts more, not numb.  Putting weight on it even in bed the feet hurt if bending.  Analgesic cream used aspercreme and lidocaine.  No Advil and not much ice.     Shoes a little more stability but hurts the same.  Worse as the day goes on. Left wrist is sore and locking up.  Knees and hips are soreness. No swelling.  Retaining fluid and weight.     CT done of the feet done before procedure in June.  [facility].       Walks funny all the time.  Had support tape on it, felt like a little pop in it and seemed to be better.   Numb feeling form the anesthetic did not help.     After the bone spur surgery     Right toe has a bone spur on US and Gt did another Xray with a bone spur.  Recommended that the " spur comes on.  Rocking up on toes grinds and hurts.  2016 sesamoid was getting caught in the bone spur on the left.     Feels tired easily. Brain concentration comes and goes.   Better with word finding then it was.     Hot flashes but not night sweats two months ago, not for years. LMP 4/2019.  [facility] options doing the Testosterone.  GYN quit, [facility].      Oct for Testosterone.   Thyroid Synergy.     On BEG spray still, forgets to take in the fridge.  Jan last test for Marcfrankie.     Bowels working but constipated on the Cholestyramine.        Spore probiotic    No EMg done in the last year  Vit D 64887 a day  Level was 50  B12 5000 ugm three times a week    Melatonin 2.5 mg, takes 1 at night  On Ashwagandha    Osmosis reverse   Replacing the front porch now also.    Jennifer Mckeon    Review of Systems   No CP, Sob, palpitations.        Physical Exam      Other:    Labs:     MSQ Score: [ ]    ASSESSMENT:  1. Increased bilateral dorsal foot pain in setting of great toe OA and bone spurs and tendinopathy of both feet, s/p left great toe replacement in 2015.  No response to PRPO in both feet 1 month ago, and increased dorsal foot pain for 2 months.  Also some increase in wrist and knee pain.  Previous negative testing for Rheum diseases and Lyme.    2. Mold toxicity:  HLA genotype positive, MMP 9 was 453, VCS failed, Multiple areas with water damage and mold at home (basement, bedroom)  .  Now MMP9 down to 373, TGF B1 Nl.    3. Positive RUDY 9/2019:  S/p 6 weeks of treatment with BEG nose spray with Xlear.  Positive again  In Jan 2020 with current sporadic treatment with BEG spray, will get about one month in by another week  (Aug 2020)  4. dysbiosis with depressed beneficial bacteria, low elastase, low sIgA, impaired butyrate (multiple antibiotics for years) with bloating and contstipation on carbs, s/p 3 months of treatment with GI fortify, arabinogalactan, probiotics.    5. Improving on  no grains exc rice diet,   excluding high mold foods like corn, peanuts, coffee, gluten.  Restarting dairy, shellfish and eggs.  Off soy.  Not having much of any GI complaints now.    6. Obesity resistant, due to mold toxicity, dysbiosis, altered stress response, environmental toxins  7. Hashimoto's thyroiditis, tpo 59 decreased to 50 with NL thyroid functions 10/31/2019, Now Normal TPo ab as of 6/2020  8. Testosterone replacement, level at 99 for retesting and reinsertion of pellet in Jan. 2020 done, now to do again in Oct 2020 with persistent elevated levels, and one year since LMP.   9. Depressed dhea and pregnenolone, menopausal/mold toxin  10. Environmental allergies and asthma, improved  11. Other food allergy?  12. Left great Toe joint replacement, recurrent pain in the left and right feet  13. Recurrent sinusitis has improved and allergies have also decreased significantly  14. FHx of alzheimer, Mild cognitive impairment, more intermittent sx since mold cleanup  15. Vit D deficiency  16. Lower then ideal zinc, improved on thyroid synergy  17. Insomnia and anxiety improved  18. HLA DQ2 gene positive, with negative celiac ab screen  19. Altered suppressed cortisol circadian pattern last year on ZRT 4 pt saliva test.   20. Menopause LMP April 2019.    21. 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 Aching. Fatigue, mood swings, obesity due to mold toxins  23. Palpitations for 20 years, most likely PAC's, no runs of tachycardia, just skipped beat feeling, last about a month ago.   24. Small umbilical hernia      1. The patient reviewed and signed our practice consents at their first visit, including their pledge to obtain primary care services from a primary care physician and that we will not serve the role, and also will not bill insurance, as this is a cash only practice.  We also discussed the nature of an integrative medicine practice, and that we are offering treatments that are complementary  or alternative to traditional medical treatments, particularly when those treatments have not been successful in making the patient healthier.  Most patients come to this practice looking for treatments that are less toxic, and are also looking for approaches that are innovative or different from the traditional medical approach, with the hope of finding greater health through these other approaches.  The patient understands that these treatments are often less studied then medications approved by the FDA, and they still might have side effects, that require monitoring and follow-up visits in order to assess the patient's response to any treatments we recommend.  The patient also pledges to advise their primary care physician of any treatment programs we undertake.  Patient is free to call me, or communicate via email through our portal, with any questions about treatments we propose.      PLAN:  1. [ ] Increasing foot pain and maybe overall pain and aching:  Persisting CIRS, environmental toxins seen on GPL tox, altered mechanics and previous surgeries and procedures, new inflammatory condition or infection?  2. Reactive depression:  Start SAMe 200 mg bid before meals, for pain and mood  3. Discussed her improved autoimmune thyroiditis on Thyroid Synergy but persistent sx of fatigue, weight gain, fluid retention, aching consistent with Thyroid malfunction, despite other interventions.  She is interested in a thyroid hormone trial. Disc pros and cons, and SE's like increased anxiety, palpitations and A fib.  Will proceed with trial of low dose T3 only, 5 ugm in am for a week, then one bid before meals.  Check levels in a month.  Also check RF/Lyme. Call and report on condition in about  3 - 4 weeks.  4. Went over findings of GPL tox again and to start a Detox program. Reviewed all her supplements and several discontinued to be replaced by new program:  NAC 1000 bid,  a day, and Mediclear SGS 2 sc a day (for 1  - 2 years perhaps)  .  Plan on repeat GPL tox in 1 year.   5. 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  Consider APOE testing and NeuroQuant MRI  6. To see GYN about stopping the Testosterone pellet (high levels unopposed by female hormones now in menopause) and switch to HRT with E2 and Progesterone, and maybe only Testosterone cream.    7. AVS:  1. Stop the cholestyramine powder  2. Thyroid Liothyronine (cytomel) 5 micrograms one before breakfast for a week, then one before breakfast and dinner.  Call after 3 - 4 weeks on that.    3. Stop the Thyroid Synergy  4. We will look up your CT scan from June 5. Talk to the [employer] people about regular female hormone replacement and stopping the Testosterone pellets, consider Testosterone cream only.   6. Probiotic Sacchromyces boulardi by [employer] or [employer] 2 a day  7. N-Acetyl Cysteine (NAC)  1000 mg twice a day, by Aquiles Stewart or [employer]  8. ALA (alpha lipoic acid) 600 mg a day with biotin ( 2 of the 300 mg pills)  9. Mediclear SGS powder (or Mediclear Plus with flavoring added) by Lyndsay  10. [employer] [place] Blend, 1 dropper full in small amount of water up to three times a day as needed for pain.   11. SAMe 200 mg one twice a day with the thyroid before meals.  12. Finish the BEG nose spray then start the EDTA nose spray from [facility].   13. Blood test in one month on the thyroid.    RTO:   [ ] 1 mo

## 2020-08-04 RX ORDER — ALPHA LIPOIC ACID 600 MG
TABLET ORAL
COMMUNITY
End: 2021-11-12 | Stop reason: ALTCHOICE

## 2020-09-20 DIAGNOSIS — D89.89 AUTOIMMUNE DISORDER (CMS/HCC): ICD-10-CM

## 2020-09-20 DIAGNOSIS — Z77.120 EXPOSURE TO MOLD: ICD-10-CM

## 2020-09-20 DIAGNOSIS — M25.571 ARTHRALGIA OF BOTH ANKLES: ICD-10-CM

## 2020-09-20 DIAGNOSIS — M79.10 MYALGIA: ICD-10-CM

## 2020-09-20 DIAGNOSIS — E06.3 HASHIMOTO'S THYROIDITIS: Primary | ICD-10-CM

## 2020-09-20 DIAGNOSIS — M25.572 ARTHRALGIA OF BOTH ANKLES: ICD-10-CM

## 2020-09-20 DIAGNOSIS — R53.82 CHRONIC FATIGUE: ICD-10-CM

## 2020-09-20 DIAGNOSIS — R10.84 GENERALIZED ABDOMINAL PAIN: ICD-10-CM

## 2020-10-08 LAB
B BURGDOR IGG PATRN SER IB-IMP: NEGATIVE
B BURGDOR IGM PATRN SER IB-IMP: NEGATIVE
B BURGDOR18KD IGG SER QL IB: ABNORMAL
B BURGDOR23KD IGG SER QL IB: ABNORMAL
B BURGDOR23KD IGM SER QL IB: ABNORMAL
B BURGDOR28KD IGG SER QL IB: ABNORMAL
B BURGDOR30KD IGG SER QL IB: ABNORMAL
B BURGDOR39KD IGG SER QL IB: ABNORMAL
B BURGDOR39KD IGM SER QL IB: PRESENT
B BURGDOR41KD IGG SER QL IB: ABNORMAL
B BURGDOR41KD IGM SER QL IB: ABNORMAL
B BURGDOR45KD IGG SER QL IB: ABNORMAL
B BURGDOR58KD IGG SER QL IB: PRESENT
B BURGDOR66KD IGG SER QL IB: ABNORMAL
B BURGDOR93KD IGG SER QL IB: ABNORMAL
CK SERPL-CCNC: 98 U/L (ref 32–182)
RHEUMATOID FACT SERPL-ACNC: <10 IU/ML (ref 0–13.9)
T3FREE SERPL-MCNC: 3.7 PG/ML (ref 2–4.4)
T4 FREE SERPL-MCNC: 1.05 NG/DL (ref 0.82–1.77)
TSH SERPL DL<=0.005 MIU/L-ACNC: 1.03 UIU/ML (ref 0.45–4.5)

## 2020-10-09 LAB
CCP IGA+IGG SERPL IA-ACNC: 4 UNITS (ref 0–19)
CRP SERPL-MCNC: 1 MG/L (ref 0–10)
THYROPEROXIDASE AB SERPL-ACNC: 55 IU/ML (ref 0–34)

## 2020-10-12 LAB — SPECIMEN STATUS: NORMAL

## 2020-10-19 DIAGNOSIS — E06.3 HASHIMOTO'S THYROIDITIS: ICD-10-CM

## 2020-10-19 DIAGNOSIS — D89.89 AUTOIMMUNE DISORDER (CMS/HCC): ICD-10-CM

## 2020-10-19 DIAGNOSIS — M79.10 MYALGIA: ICD-10-CM

## 2020-10-19 DIAGNOSIS — Z77.120 CONTACT WITH MOLD: Primary | ICD-10-CM

## 2020-10-19 DIAGNOSIS — R10.84 GENERALIZED ABDOMINAL PAIN: ICD-10-CM

## 2020-10-19 DIAGNOSIS — R63.5 WEIGHT GAIN, ABNORMAL: ICD-10-CM

## 2020-10-19 DIAGNOSIS — E78.00 PURE HYPERCHOLESTEROLEMIA: ICD-10-CM

## 2020-10-20 LAB — MMP9 SER-MCNC: 890 NG/ML

## 2020-11-04 ENCOUNTER — HOSPITAL ENCOUNTER (OUTPATIENT)
Dept: CARDIOLOGY | Facility: HOSPITAL | Age: 55
Discharge: HOME | End: 2020-11-04
Attending: ORTHOPAEDIC SURGERY
Payer: COMMERCIAL

## 2020-11-04 ENCOUNTER — TELEPHONE (OUTPATIENT)
Dept: INTEGRATIVE MEDICINE | Age: 55
End: 2020-11-04

## 2020-11-04 ENCOUNTER — TRANSCRIBE ORDERS (OUTPATIENT)
Dept: LAB | Facility: HOSPITAL | Age: 55
End: 2020-11-04

## 2020-11-04 DIAGNOSIS — Z01.818 ENCOUNTER FOR OTHER PREPROCEDURAL EXAMINATION: ICD-10-CM

## 2020-11-04 DIAGNOSIS — Z01.818 ENCOUNTER FOR OTHER PREPROCEDURAL EXAMINATION: Primary | ICD-10-CM

## 2020-11-04 LAB
ATRIAL RATE: 61
P AXIS: 55
PR INTERVAL: 186
QRS DURATION: 94
QT INTERVAL: 430
QTC CALCULATION(BAZETT): 432
R AXIS: 78
T WAVE AXIS: 54
VENTRICULAR RATE: 61

## 2020-11-04 PROCEDURE — 93005 ELECTROCARDIOGRAM TRACING: CPT

## 2020-11-05 LAB
ALBUMIN SERPL-MCNC: 4.3 G/DL (ref 3.6–5.1)
ALBUMIN/GLOB SERPL: 1.7 (CALC) (ref 1–2.5)
ALP SERPL-CCNC: 87 U/L (ref 37–153)
ALT SERPL-CCNC: 28 U/L (ref 6–29)
AST SERPL-CCNC: 28 U/L (ref 10–35)
BILIRUB SERPL-MCNC: 0.4 MG/DL (ref 0.2–1.2)
BUN SERPL-MCNC: 16 MG/DL (ref 7–25)
BUN/CREAT SERPL: NORMAL (CALC) (ref 6–22)
CALCIUM SERPL-MCNC: 9.4 MG/DL (ref 8.6–10.4)
CHLORIDE SERPL-SCNC: 106 MMOL/L (ref 98–110)
CHOLEST SERPL-MCNC: 196 MG/DL
CHOLEST/HDLC SERPL: 5 (CALC)
CO2 SERPL-SCNC: 25 MMOL/L (ref 20–32)
CREAT SERPL-MCNC: 0.55 MG/DL (ref 0.5–1.05)
ERYTHROCYTE [DISTWIDTH] IN BLOOD BY AUTOMATED COUNT: 12.8 % (ref 11–15)
GLOBULIN SER CALC-MCNC: 2.6 G/DL (CALC) (ref 1.9–3.7)
GLUCOSE SERPL-MCNC: 85 MG/DL (ref 65–99)
HCT VFR BLD AUTO: 43.5 % (ref 35–45)
HDLC SERPL-MCNC: 39 MG/DL
HGB BLD-MCNC: 14 G/DL (ref 11.7–15.5)
IGF-I SERPL-MCNC: 168 NG/ML (ref 50–317)
IGF-I Z-SCORE SERPL: 0.4 SD
LDLC SERPL CALC-MCNC: 127 MG/DL (CALC)
LIPASE SERPL-CCNC: 30 U/L (ref 7–60)
MCH RBC QN AUTO: 30.5 PG (ref 27–33)
MCHC RBC AUTO-ENTMCNC: 32.2 G/DL (ref 32–36)
MCV RBC AUTO: 94.8 FL (ref 80–100)
NONHDLC SERPL-MCNC: 157 MG/DL (CALC)
PLATELET # BLD AUTO: 308 THOUSAND/UL (ref 140–400)
PMV BLD REES-ECKER: 10.2 FL (ref 7.5–12.5)
POTASSIUM SERPL-SCNC: 4.5 MMOL/L (ref 3.5–5.3)
PROT SERPL-MCNC: 6.9 G/DL (ref 6.1–8.1)
QUEST EGFR NON-AFR. AMERICAN: 107 ML/MIN/1.73M2
QUEST HUMAN TRANSFORMING GROWTH FACTOR BETA 1 (TGF-B1): 6640 PG/ML (ref 344–2382)
RBC # BLD AUTO: 4.59 MILLION/UL (ref 3.8–5.1)
SODIUM SERPL-SCNC: 143 MMOL/L (ref 135–146)
TRIGL SERPL-MCNC: 179 MG/DL
WBC # BLD AUTO: 6.7 THOUSAND/UL (ref 3.8–10.8)

## 2020-11-06 ENCOUNTER — TELEPHONE (OUTPATIENT)
Dept: INTEGRATIVE MEDICINE | Age: 55
End: 2020-11-06

## 2020-11-06 NOTE — TELEPHONE ENCOUNTER
Will have COVID test done on 11/6/20 in preparation for surgery. Will call us as soon as results are in.

## 2020-11-09 ENCOUNTER — OFFICE VISIT (OUTPATIENT)
Dept: INTEGRATIVE MEDICINE | Age: 55
End: 2020-11-09

## 2020-11-09 ENCOUNTER — TELEPHONE (OUTPATIENT)
Dept: INTEGRATIVE MEDICINE | Age: 55
End: 2020-11-09

## 2020-11-09 VITALS
SYSTOLIC BLOOD PRESSURE: 102 MMHG | TEMPERATURE: 97.9 F | HEIGHT: 69 IN | BODY MASS INDEX: 30.66 KG/M2 | HEART RATE: 62 BPM | DIASTOLIC BLOOD PRESSURE: 72 MMHG | RESPIRATION RATE: 16 BRPM | WEIGHT: 207 LBS | OXYGEN SATURATION: 98 %

## 2020-11-09 DIAGNOSIS — Z77.120 CONTACT WITH MOLD: ICD-10-CM

## 2020-11-09 DIAGNOSIS — M79.672 FOOT PAIN, BILATERAL: ICD-10-CM

## 2020-11-09 DIAGNOSIS — M25.571 ARTHRALGIA OF BOTH ANKLES: ICD-10-CM

## 2020-11-09 DIAGNOSIS — D89.89 AUTOIMMUNE DISORDER (CMS/HCC): ICD-10-CM

## 2020-11-09 DIAGNOSIS — E06.3 HASHIMOTO'S THYROIDITIS: Primary | ICD-10-CM

## 2020-11-09 DIAGNOSIS — M79.10 MYALGIA: ICD-10-CM

## 2020-11-09 DIAGNOSIS — E66.9 OBESITY (BMI 30-39.9): ICD-10-CM

## 2020-11-09 DIAGNOSIS — M25.572 ARTHRALGIA OF BOTH ANKLES: ICD-10-CM

## 2020-11-09 DIAGNOSIS — M79.671 FOOT PAIN, BILATERAL: ICD-10-CM

## 2020-11-09 PROCEDURE — INTG103 PR FOLLOW-UP CONSULT (60 MIN): Performed by: FAMILY MEDICINE

## 2020-11-09 RX ORDER — BUPROPION HYDROCHLORIDE 150 MG/1
150 TABLET ORAL DAILY
Qty: 90 TABLET | Refills: 1 | Status: SHIPPED | OUTPATIENT
Start: 2020-11-09 | End: 2021-02-25

## 2020-11-09 RX ORDER — COLESEVELAM 180 1/1
TABLET ORAL
Qty: 120 TABLET | Refills: 6 | Status: SHIPPED | OUTPATIENT
Start: 2020-11-09 | End: 2021-02-24

## 2020-11-09 ASSESSMENT — ENCOUNTER SYMPTOMS
CONFUSION: 0
JOINT SWELLING: 0
NERVOUS/ANXIOUS: 1
COLOR CHANGE: 0
PALPITATIONS: 0
CHEST TIGHTNESS: 0
HALLUCINATIONS: 0
HEMATURIA: 0
APPETITE CHANGE: 0
FEVER: 0
ACTIVITY CHANGE: 0
VOMITING: 0
MYALGIAS: 1
DIZZINESS: 0
BLOOD IN STOOL: 0
DYSPHORIC MOOD: 1
SLEEP DISTURBANCE: 0
FATIGUE: 1
UNEXPECTED WEIGHT CHANGE: 0
AGITATION: 0
ARTHRALGIAS: 1
SINUS PRESSURE: 1
ADENOPATHY: 0
EYE PAIN: 0
DECREASED CONCENTRATION: 1
SHORTNESS OF BREATH: 0

## 2020-11-09 NOTE — PATIENT INSTRUCTIONS
1. You are cleared for the foot surgery tomorrow.  After recovering from the surgery for a week, you may begin the following recommendations:  2. Look into full hormone replacement therapy with estrogen, progesterone, and testosterone.  We will send you names of GYN docs in [place] to check out  3. Need a toxin binder, and I would start welchol pills 1 before lunch and dinner for 1 week, then 2 before lunch and dinner thereafter.  I will call in a prescription today  4. Can start low dose naltrexone, for pain relief, and as a anti-inflammatory.  I will call the [facility] and check on the price of that medicine to take one at night.    5. Wellbutrin  mg a day which may be best to take early in a day for mood swings, and fatigue  6. Thyroid 2 of the 5 microgram pills once a day  7. After one month, repeat blood test, we will send you a slip  8. Discontinue arabinogalactan and SAMe.  Discontinue pregnenolone  9. Continue Host Defense Cordyceps, alpha lipoic sustain, vitamin D, ashwagandha, N-acetylcysteine, Saccharomyces Boulardii probiotic, magnesium, zinc, and melatonin at night.  May also continue Nerve Control 911 and the turmeric supplement.  10. MRI of the brain will be ordered, do at [facility] across [address] from the office if possible  11.  I will send you a slip for a repeat blood test in about 1 month

## 2020-11-09 NOTE — PROGRESS NOTES
[facility] Follow Up Visit    Date: 2020    Name: Magaly Aranda    : 1965    Reason for visit:     Allergies: Patient has no known allergies.    Medications:   Current Outpatient Medications   Medication Sig Dispense Refill   • acetylcysteine (NAC ORAL) Take 1,000 mg by mouth 2 (two) times a day.     • albuterol HFA (VENTOLIN HFA) 90 mcg/actuation inhaler Inhale 2 puffs every 6 (six) hours as needed for wheezing. 3 Inhaler 3   • alpha lipoic acid 600 mg tablet Take by mouth.     • ASHWAGANDHA ROOT EXTRACT,BULK, MISC 1,950 mg. Physicians choice     • cholecalciferol, vitamin D3, (VITAMIN D3 ORAL) Take 10,000 Units by mouth.     • diphenhydramine HCl (SLEEP ORAL) Take by mouth. Sleep gummies. Natures bounty     • HERBAL DRUGS ORAL Take by mouth. arabingolactn     • liothyronine (CYTOMEL) 5 mcg tablet Take 1 tablet (5 mcg total) by mouth daily. Before breakfast for a week, then one before breakfast and dinner as directed 60 tablet 6   • MAGNESIUM ORAL Take 500 mg by mouth. Finest nutrition.     • multivit-minerals/folic acid (ADULT MULTIVITAMIN GUMMIES ORAL) Take by mouth. Smarty pants womens complete     • NOT IN DATABASE hsot defense, mushrooms. Cordyceps. Energy support     • PREGNENOLONE, BULK, MISC      • ZINC ORAL Take by mouth.     • budesonide (PULMICORT FLEXHALER) 90 mcg/actuation inhaler Inhale 1 puff 2 (two) times a day. Rinse mouth with water after use to reduce aftertaste and incidence of candidiasis. Do not swallow.  Stop the QVAR inhaler. (Patient not taking: Reported on 2020 ) 3 Inhaler 3   • cholestyramine (QUESTRAN) 4 gram packet Take 1/2 a packet (2 gm) dissolved in 6 ounces of water about 30 - 45 minutes before lunch and dinner. (Patient not taking: Reported on 2020 ) 180 packet 3   • DIETARY SUPPLEMENT ORAL Take by mouth. Mediclear SGS Powder     • edetate calcium disodium in dextrose 5 % 500 mL infusion 1 mg daily. EDTA nose spray 0.25%  with mucolox, 2 squirts each nostril Bid for 3 months, Rx to [facility], 9/10/2020     • HERBAL DRUGS ORAL Take by mouth. Interface     • Lactobacillus acidophilus (PROBIOTIC ORAL) Take by mouth. MegaSpore probiotic     • s-adenosylmethionine sul tosyl (SUDHA-E ORAL) Take by mouth 2 (two) times a day.     • SACCHAROMYCES BOULARDII ORAL Take by mouth 2 (two) times a day.       No current facility-administered medications for this visit.        Supplements: [ ]    Family History:   Family History   Problem Relation Age of Onset   • Hypertension Biological Mother    • Alzheimer's disease Biological Mother    • Diabetes Biological Father    • Hepatitis Biological Father    • Hypertension Biological Father          Social History:   Social History     Socioeconomic History   • Marital status:      Spouse name: None   • Number of children: None   • Years of education: None   • Highest education level: None   Occupational History   • None   Social Needs   • Financial resource strain: None   • Food insecurity     Worry: None     Inability: None   • Transportation needs     Medical: None     Non-medical: None   Tobacco Use   • Smoking status: Former Smoker     Years: 10.00   • Smokeless tobacco: Never Used   • Tobacco comment: QUIT 2009   Substance and Sexual Activity   • Alcohol use: Yes     Comment: MODERATE   • Drug use: No   • Sexual activity: None   Lifestyle   • Physical activity     Days per week: None     Minutes per session: None   • Stress: None   Relationships   • Social connections     Talks on phone: None     Gets together: None     Attends Faith service: None     Active member of club or organization: None     Attends meetings of clubs or organizations: None     Relationship status: None   • Intimate partner violence     Fear of current or ex partner: None     Emotionally abused: None     Physically abused: None     Forced sexual activity: None   Other Topics Concern   • None   Social History  "Narrative   • None        Vitals:   Visit Vitals  /72 (BP Location: Left upper arm, Patient Position: Sitting)   Pulse 62   Temp 36.6 °C (97.9 °F) (Temporal)   Resp 16   Ht 1.74 m (5' 8.5\")   Wt 93.9 kg (207 lb)   SpO2 98%   BMI 31.02 kg/m²       Weight: Weight: 93.9 kg (207 lb)     Height: Height: 174 cm (5' 8.5\")     BMI:Body mass index is 31.02 kg/m².    MARY: [ ]    % Body Fat: 45.6    % Muscle Mass: 62.2    Visceral fat:  20    WC: [ ]    NSR:       HPI  Right foot debridement and cleaning up around big toe. Tomorrow surgery.     Got an EKG done.   Word finding a problem   Sending us the form    Local anesthesia, ankle block   No reactions to anesthesia and pain meds  Had itching once after surgery.    Given oxycodone in the past.  Has used percocet from the [facility]  Percocet tolerated before.     Will wear Shoe post op.   Surgeon To see post op in 2 weeks.     No asthma sx, not using inhalers.   Covid test done 2 weeks ago, and one Friday.   Right foot is the first to have a procedure .  Injections in the past for pain.     Slid and jammed foot in the 20's,  bunion surgery 2013 on the left done.  2014 joint implant left  2016 had the bone spur removed on the left  PRP injections of the great toes in June 2020  Cryoablation to neuroma of the left foot also.  Persistent pain in both feet     Unstable feeling and pain on the right when golfing  Left would require more surgery and fusion for February 2021    Taking a new nerve supplement that has improved , pain all over has lessened, new turmeric supplement  Nerve control 911    Perimenopausal.  Had a normal period in October.  Mid month.  None this month.   GYN did a bx of the uterus and did twice, endometrial.  Not hyperplasia. Did not talk to her after that.  Dr BioIdentical Testosterone was last pellet , may 7 mo now.   Now can't remember things and losing words.      Review of Systems   Constitutional: Positive for fatigue. Negative for activity change, " appetite change, fever and unexpected weight change.   HENT: Positive for postnasal drip and sinus pressure.    Eyes: Negative for pain.   Respiratory: Negative for chest tightness and shortness of breath.    Cardiovascular: Negative for chest pain, palpitations and leg swelling.   Gastrointestinal: Negative for blood in stool and vomiting.   Endocrine: Positive for heat intolerance.   Genitourinary: Negative for hematuria.   Musculoskeletal: Positive for arthralgias, gait problem and myalgias. Negative for joint swelling.   Skin: Negative for color change, pallor and rash.   Allergic/Immunologic: Positive for environmental allergies.   Neurological: Negative for dizziness and syncope.   Hematological: Negative for adenopathy.   Psychiatric/Behavioral: Positive for decreased concentration and dysphoric mood. Negative for agitation, confusion, hallucinations and sleep disturbance. The patient is nervous/anxious.        Physical Exam  Vitals signs and nursing note reviewed.   Constitutional:       General: She is not in acute distress.     Appearance: Normal appearance. She is well-developed.   HENT:      Head: Normocephalic.      Right Ear: Tympanic membrane normal.      Left Ear: Tympanic membrane normal.      Nose: Nose normal.      Mouth/Throat:      Mouth: Mucous membranes are moist.      Pharynx: Oropharynx is clear.      Comments: Tongue has enlarged and jaw seems to have enlarged also.   Eyes:      General: No scleral icterus.     Extraocular Movements: Extraocular movements intact.      Conjunctiva/sclera: Conjunctivae normal.      Pupils: Pupils are equal, round, and reactive to light.   Neck:      Musculoskeletal: Normal range of motion.      Thyroid: No thyromegaly.   Cardiovascular:      Rate and Rhythm: Normal rate and regular rhythm.      Pulses: Normal pulses.      Heart sounds: Normal heart sounds.   Pulmonary:      Effort: Pulmonary effort is normal.      Breath sounds: Normal breath sounds. No  wheezing.   Abdominal:      General: Abdomen is flat. Bowel sounds are normal.   Musculoskeletal:         General: Deformity (increase bony prominence of the right first MTP joint.  Scar over the left first MTP joint.  Callous over the 4th right metatarsal of the sole.   Foot size has enlarged. ) present. No swelling.      Right lower leg: No edema.   Lymphadenopathy:      Cervical: No cervical adenopathy.   Skin:     General: Skin is warm and dry.      Capillary Refill: Capillary refill takes 2 to 3 seconds.      Coloration: Skin is not jaundiced or pale.      Findings: No bruising or rash.   Neurological:      Mental Status: She is alert and oriented to person, place, and time.      Cranial Nerves: No cranial nerve deficit.   Psychiatric:         Mood and Affect: Mood normal.         Behavior: Behavior normal.         Thought Content: Thought content normal.         Judgment: Judgment normal.           Other:    Labs:     MSQ Score: [ ]      ASSESSMENT:  1. Increased bilateral dorsal foot pain in setting of great toe OA and bone spurs and tendinopathy of both feet, s/p left great toe replacement in 2015.  No response to PRP in both feet June 2020, and increased dorsal foot pain for 2 months.  Also some increase in wrist and knee pain.  Previous negative testing for Rheum diseases and Lyme.   Repeat testing for Lyme nad Inflammatory arthritis negative 10/7/2020.  Will have bone spur removal tomorrow at [facility] [place].  2. CIRS and water damaged building toxicity:  HLA genotype positive, MMP 9 was 453, VCS failed, Multiple areas with water damage and mold at home (basement, bedroom)  .  MMP9 had been down to 373, TGF B1 Nl.   Now in Nov 2020 MMP 9 801 and TGFB1 6600 again.  On EDTA for RUDY.    3. Positive RUDY 9/2019:  S/p 6 weeks of treatment with BEG nose spray with Xlear.  Positive again  In Jan 2020 with current sporadic treatment with BEG spray, will get about one month in by another week  (Aug  2020).  nOW ONEDTA only  4. dysbiosis with depressed beneficial bacteria, low elastase, low sIgA, impaired butyrate (multiple antibiotics for years) with bloating and contstipation on carbs, s/p 3 months of treatment with GI fortify, arabinogalactan, probiotics.    5. Improving on no grains exc rice diet,   excluding high mold foods like corn, peanuts, coffee, gluten.  Restarting dairy, shellfish and eggs.  Off soy.  Not having much of any GI complaints now.    6. Obesity resistant, due to mold toxicity, dysbiosis, altered stress response, environmental toxins  7. Hashimoto's thyroiditis, tpo 59 decreased to 50 with NL thyroid functions 10/31/2019, TPo ab up again to 55 as of 11/2020  8. Testosterone replacement, level at 99 for retesting and reinsertion of pellet in Jan. 2020 done, now after persistent elevated levels, has not had replaced after 7 months.  Has had two periods in the last 6 months  GYN eval with endometrial bx was negative last month. Perimenopausal.   9. Depressed dhea and pregnenolone, menopausal/mold toxin  10. Environmental allergies and asthma, improved  11. Other food allergy?  12. Left great Toe joint replacement  13. Recurrent sinusitis has improved and allergies have also decreased significantly  14. FHx of alzheimer in Mom, Mild cognitive impairment, more intermittent sx since mold cleanup  15. Vit D deficiency  16. Lower then ideal zinc, improved on zinc replacement  17. Insomnia and anxiety improved  18. HLA DQ2 gene positive, with negative celiac ab screen  19. Altered suppressed cortisol circadian pattern last year on ZRT 4 pt saliva test.   20. Menopause LMP April 2019.    21. 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 Aching. 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 Palpitations for 20 years, most likely PAC's, no runs of tachycardia, just skipped beat feeling, last about a month ago.   24. Small umbilical hernia  25. Major Depression, moderate, reactive  component due to chronic pain and toxin exposure    1. The patient reviewed and signed our practice consents at their first visit, including their pledge to obtain primary care services from a primary care physician and that we will not serve the role, and also will not bill insurance, as this is a cash only practice.  We also discussed the nature of an integrative medicine practice, and that we are offering treatments that are complementary or alternative to traditional medical treatments, particularly when those treatments have not been successful in making the patient healthier.  Most patients come to this practice looking for treatments that are less toxic, and are also looking for approaches that are innovative or different from the traditional medical approach, with the hope of finding greater health through these other approaches.  The patient understands that these treatments are often less studied then medications approved by the FDA, and they still might have side effects, that require monitoring and follow-up visits in order to assess the patient's response to any treatments we recommend.  The patient also pledges to advise their primary care physician of any treatment programs we undertake.  Patient is free to call me, or communicate via email through our portal, with any questions about treatments we propose.      PLAN:  See pre op H and P for bone spur removal of the right great toe joint. Cleared for Surgery  1. [ ] So beyond the foot surgery, she has had persistent problems with losing weight, having intermittent myalgias, mood swings, interrupted sleep, headaches, fatigue, brain fog.  These are consistent with her chronic inflammatory response syndrome.  She did a complete renovation at home and eliminated the many sources of water intrusion that were present in their old townhouse.  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  Her MMP 9 and TGF-beta 1 have  increased.  Her generalized aching however has improved, with negative testing for Lyme, and autoimmune diseases again.  She feels that the extra turmeric, and her nerve control 911 supplement, that includes California poppy, prickly pear, passion flower, marshmallow root, and Corydalis (which has been shown to relieve pain).    2. Her goal now is to proceed with the rest of the Edson Sheridan protocol for treatment of chronic inflammatory response syndrome now that we have treated her Luigi's nasal infection with EDTA, and the environment at home has been cleaned up.  3. After surgery, she will begin the following combination: WelChol 2 before lunch and dinner as a toxin binder.  4. Also low-dose naltrexone 1.5 mg at night, will have to have this compounded most likely  5. We will also begin Wellbutrin  mg a day, this is to approximate the Contrave weight loss medicine but also take advantage of the anti-inflammatory, and pain relieving properties of the low-dose naltrexone, and the antidepressant properties of the Wellbutrin.  6. She will continue liothyronine 5 mcg pills, 2 once a day in the morning.  7. She will discontinue SAMe, arabinogalactan, pregnenolone.  8. We will order an MRI of the brain to check on her pituitary because of symptoms and signs consistent with acromegaly.  She also has had closed head injuries and is having increasing cognitive decline.    9. This  will also allow us to do a neuroquant to assess her risk for Alzheimer by measuring Hippocampus changes with her family history, versus the caudate changes that would be pathognomonic of chronic inflammatory response syndrome.  10. The end goal is to recheck her MMP 9 and TGF-beta 1:01 month on the WelChol and low-dose naltrexone, and see if they have decreased again, and we will then proceed with VIP treatment which is the ultimate method for reversing the cognitive, weight, brain, and hormonal changes of the environmental toxin  illness.  11. She will continue NAC and alpha lipoic acid due to the presence of multiple environmental toxins on the [facility] toxin screen.  12. I feel she would benefit from full hormone replacement at this point in her perimenopausal journey.  I would like her to not be taking the testosterone in the pellet form but and just a cream form locally around the clitoris.  We will try to refer her to a gynecologist for an evaluation and hopefully starting on estradiol patch, progesterone 200 mg pills, and testosterone cream which will compound.  This has a better chance of helping prevent further cognitive decline with her risk of Alzheimer.  Consider APOE 4 testing  13. Please see AVS:  1. You are cleared for the foot surgery tomorrow.  After recovering from the surgery for a week, you may begin the following recommendations:  2. Look into full hormone replacement therapy with estrogen, progesterone, and testosterone.   3. Need a toxin binder, and I would start welchol pills 1 before lunch and dinner for 1 week, then 2 before lunch and dinner thereafter.  I will call in a prescription today  4. Can start low dose naltrexone, for pain relief, and as a anti-inflammatory   5. Wellbutrin  mg a day which may be best to take early in a day for mood swings, and fatigue  6. Thyroid 2 of the 5 microgram pills once a day  7. After one month, repeat blood test, we will send you a slip  8. Discontinue arabinogalactan and SAMe.  Discontinue pregnenolone  9. Continue Host Defense Cordyceps, alpha lipoic sustain, vitamin D, ashwagandha, N-acetylcysteine, Saccharomyces Boulardii probiotic, magnesium, zinc, and melatonin at night.  May also continue Nerve Control 911 and the turmeric supplement.  10. MRI of the brain will be ordered, do at [facility] across [address] from the office if possible  11.  I will send you a slip for a repeat blood test in about 1 month      RTO:   [ ] 6 weeks

## 2020-11-10 ENCOUNTER — TELEPHONE (OUTPATIENT)
Dept: INTEGRATIVE MEDICINE | Age: 55
End: 2020-11-10

## 2020-11-12 NOTE — TELEPHONE ENCOUNTER
Approved, approval number [id number]. Magaly is aware, mailed out order with neuroquant packet, she is aware how to proceed with neuroquant. She will call if any questions   
Ask the patient if this is OK, 100 will probably last 2 months (will increase to at least 2 pills a day eventually)      
Called [employer] for initiate precert and their computer system is down. Will call back  
LM there are two cards in system for her, [facility] and [facility].  Which is current?  
LM would she like to pay for the low dose naltrexone, if so I will call in  
Spoke with patient. Called in low dose naltrexone 1.5 mg, 1 tab po q pm as directed, disp 100 with 5 refills.  Primary insurance in [facility]  
Spoke with pharmacist at [facility] [place], [phone], Low Dose Naltrexone 1.5 mg, disp 100 is $105.00.  Is this okay to call in?    
Submitted pre cert information through [employer]  
Okay to give Hydralazine 100mg PO, continue to monitor.
PA aware, will reach out to cardiology pending consult, continue to monitor.

## 2020-12-21 ENCOUNTER — TELEPHONE (OUTPATIENT)
Dept: INTEGRATIVE MEDICINE | Age: 55
End: 2020-12-21

## 2020-12-21 DIAGNOSIS — R68.82 DECREASED LIBIDO: ICD-10-CM

## 2020-12-21 DIAGNOSIS — D89.89 AUTOIMMUNE DISORDER (CMS/HCC): Primary | ICD-10-CM

## 2020-12-21 DIAGNOSIS — Z78.0 MENOPAUSE: ICD-10-CM

## 2020-12-21 DIAGNOSIS — Z79.890 POST-MENOPAUSE ON HRT (HORMONE REPLACEMENT THERAPY): ICD-10-CM

## 2020-12-22 NOTE — TELEPHONE ENCOUNTER
Dose is 0.5mg in 0.1 ml, with [employer] dispensing 2 clicks to equal 0.1 ml or 0.5 mg dose once a day

## 2021-01-12 LAB
DHEA-S SERPL-MCNC: 97 MCG/DL (ref 8–188)
ESTRADIOL SERPL-MCNC: 20 PG/ML
PREG SERPL-MCNC: 123 NG/DL (ref 22–237)
TESTOST SERPL-MCNC: 21 NG/DL (ref 2–45)

## 2021-01-13 ENCOUNTER — TELEPHONE (OUTPATIENT)
Dept: INTEGRATIVE MEDICINE | Age: 56
End: 2021-01-13

## 2021-01-13 NOTE — TELEPHONE ENCOUNTER
Spoke with [employer], they said that the Brain MRI was done incorrectly and the software could not read it. She stated that it looks like there was a sequence that was missing. I asked to speak with someone that could give us more information. Kaylen said she would have someone call me back.

## 2021-01-20 LAB — THYROPEROXIDASE AB SERPL-ACNC: 27 IU/ML (ref 0–34)

## 2021-01-25 NOTE — TELEPHONE ENCOUNTER
Bianca called from [employer], [phone] ext [phone]. She stated that the brain MRI's parameters were incorrectly done for [employer] software.  She can try to submit the disc but it may not be accurate, and the patient would still be charged the $251.   Bianca would like to know how Dr. Landin would like to proceed with this report.

## 2021-01-27 LAB — A-MSH SERPL-MCNC: <8 PG/ML (ref 0–40)

## 2021-01-29 LAB — MMP9 SER-MCNC: 795 NG/ML

## 2021-02-03 LAB
OSMOLALITY SERPL: 289 MOSMOL/KG (ref 275–295)
VASOPRESSIN SERPL-MCNC: <0.8 PG/ML (ref 0–4.7)

## 2021-02-23 NOTE — PROGRESS NOTES
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     Patient ID: Magaly Aranda is a 55 y.o. female.    HPI  See H and P form.    To remove the implant of the first toe on the left   Bone implant from the hip tto fuse the toe.   Bottom of the toe pain on the left.  Mechanical and remove   Right foot as painful also despite the bone spur surgery.     Injection before the trip in Jan to [facility], pain bad but did not improve.   Talked to podiatrist and ortho docs, told needed the fusion, need the soft sole shoes.     CBC, BMP today, to [facility]  EKG was in Nov    Not sick lately.  Was tiling a floor and got some cough.    General anesthesia for procedure. November anesthesia. No resp sx.    Right great toe may need fusion also  Left pelvic bone graft    Nerve control 911 not improved and stopped.    3 at night  Wellbutrin increased to 300 mg two of the 150 mg XL    Not much change, not as wigged out as a month ago.   Weight at 206, about the same the last two months.   No exercise lately    wobenzyme N stopped 4 days ago.   No fish oil.   Nose spray in the fridge EDTA, to do again    WElchol:  Says it bind her up and was too good.  More constipated with it.  One only and had to stop.      Testosterone cream every day.  Pregnenolone stopped   Did not see the GYN.  Endometrial bx done was OK    HCG shots once a day, just started for  2 days.  Low calorie in the past, did not do the reset afterwards last time, although did lose weight. Avoiding cocktails.     S      [ ]    Review of Systems    Objective     ASSESSMENT  1. Increased bilateral dorsal foot pain in setting of great toe OA and bone spurs and tendinopathy of both feet, s/p left great toe replacement in 2015.  No response to PRP in both feet June 2020, and increased dorsal foot pain for 2 months.  Also some increase in wrist and knee pain.   Previous negative testing for Rheum diseases and Lyme.   Repeat testing for Lyme nad Inflammatory arthritis negative 10/7/2020.  Had bone spur removal 11/2020 at [facility].  For removal of left great toe replacement joint 3/2/2021 by [facility], with arthrodesis and hip bone graft.   2. CIRS and water damaged building toxicity:  HLA genotype positive, MMP 9 was 453, VCS failed, Multiple areas with water damage and mold at home (basement, bedroom)  .  MMP9 had been down to 373, TGF B1 Nl.   Now in Nov 2020 MMP 9 801 and TGFB1 6600 again.  On EDTA for MARCONS.    3. Positive MARCONS 9/2019:  S/p 6 weeks of treatment with BEG nose spray with Xlear.  Positive again  In Jan 2020 with current sporadic treatment with BEG spray, will get about one month in by another week  (Aug 2020).  nOW ONEDTA only  4. dysbiosis with depressed beneficial bacteria, low elastase, low sIgA, impaired butyrate (multiple antibiotics for years) with bloating and contstipation on carbs, s/p 3 months of treatment with GI fortify, arabinogalactan, probiotics.    5. Improving on no grains exc rice diet,   excluding high mold foods like corn, peanuts, coffee, gluten.  Restarting dairy, shellfish and eggs.  Off soy.  Not having much of any GI complaints now.    6. Obesity resistant, due to mold toxicity, dysbiosis, altered stress response, environmental toxins  7. Hashimoto's thyroiditis, tpo 59 decreased to 50 with NL thyroid functions 10/31/2019, TPo ab up again to 55 as of 11/2020  8. Testosterone replacement, level at 99 for retesting and reinsertion of pellet in Jan. 2020 done, now after persistent elevated levels, has not had replaced after 7 months.  Has had two periods in the last 6 months  GYN eval with endometrial bx was negative last month. Perimenopausal.   9. Depressed dhea and pregnenolone, menopausal/mold toxin  10. Environmental allergies and asthma, improved  11. Other food allergy?  12. Recurrent sinusitis has  improved and allergies have also decreased significantly  13. FHx of alzheimer in Mom, Mild cognitive impairment, more intermittent sx since mold cleanup  14. Vit D deficiency  15. Lower then ideal zinc, improved on zinc replacement  16. Insomnia and anxiety improved  17. HLA DQ2 gene positive, with negative celiac ab screen  18. Altered suppressed cortisol circadian pattern last year on ZRT 4 pt saliva test.   19. Menopause LMP April 2019.    20. 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 Aching. 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 Palpitations for 20 years, most likely PAC's, no runs of tachycardia, just skipped beat feeling, last about a month ago.   23. Small umbilical hernia  24. Major Depression, moderate, reactive component due to chronic pain and toxin exposure    PLAN  1. Cleared for Left foot surgery.  See H and P  2. Stop the testosterone and pregnenolone, for duration of the 6 weeks of the HCG diet low calorie plan  3. Start the HCG diet 5 days after surgery  4. Start the Wobenzyme N 48 hours after surgery  5. Tape off the LDN, 2 pills for 3 days, then 1 pill for 3 days then stop.  6. Magui Baldwin to provide consultation about whether CIRS should be treated further (fish oil, Losartan, VIP)  7. Dr Nolen available for diet, lifestyle, pain consultations.   8. 3 weeks after surgery, do the visual contrast sensitivity  9. EDTA 2 squirts twice a day  10. Wellbutrin 300 mg a day for 3 weeks.    11. See AVS

## 2021-02-24 ENCOUNTER — TELEMEDICINE (OUTPATIENT)
Dept: INTEGRATIVE MEDICINE | Age: 56
End: 2021-02-24

## 2021-02-24 VITALS — WEIGHT: 205 LBS | BODY MASS INDEX: 30.72 KG/M2 | HEART RATE: 76 BPM | OXYGEN SATURATION: 95 %

## 2021-02-24 DIAGNOSIS — E66.9 OBESITY (BMI 30-39.9): ICD-10-CM

## 2021-02-24 DIAGNOSIS — E06.3 HASHIMOTO'S THYROIDITIS: ICD-10-CM

## 2021-02-24 DIAGNOSIS — M25.571 ARTHRALGIA OF BOTH ANKLES: ICD-10-CM

## 2021-02-24 DIAGNOSIS — J45.40 MODERATE PERSISTENT ASTHMA WITHOUT COMPLICATION: ICD-10-CM

## 2021-02-24 DIAGNOSIS — M79.672 FOOT PAIN, BILATERAL: Primary | ICD-10-CM

## 2021-02-24 DIAGNOSIS — M25.572 ARTHRALGIA OF BOTH ANKLES: ICD-10-CM

## 2021-02-24 DIAGNOSIS — M79.671 FOOT PAIN, BILATERAL: Primary | ICD-10-CM

## 2021-02-24 PROCEDURE — INTG106 PR EST BRIEF VISIT (30 MIN): Mod: 95 | Performed by: FAMILY MEDICINE

## 2021-02-24 NOTE — PATIENT INSTRUCTIONS
1. Stop the testosterone and pregnenolone, for duration of the 6 weeks of the HCG diet low calorie plan  2. Start the HCG diet 5 days after surgery  3. Start the Wobenzyme N 48 hours after surgery  4. Taper off the LDN, 2 pills for 3 days, then 1 pill for 3 days then stop.  5. Magui Baldwin, [facility], [phone], to set up an appointment in about 2 months to discuss any further treatment of the CIRS condition  6. 3 weeks after surgery, do the visual contrast sensitivity and send me the results  7. EDTA nasal spray 2 squirts each side twice a day  8. Wellbutrin 300 mg a day for 3 weeks, then call me .   Call with where you need new prescription filled.  Also check your refills on the Liothyronine.

## 2021-02-25 RX ORDER — BUPROPION HYDROCHLORIDE 150 MG/1
150 TABLET ORAL 2 TIMES DAILY
Qty: 120 TABLET | Refills: 2 | Status: SHIPPED | OUTPATIENT
Start: 2021-02-25 | End: 2021-11-12 | Stop reason: ALTCHOICE

## 2021-11-12 ENCOUNTER — OFFICE VISIT (OUTPATIENT)
Dept: PRIMARY CARE | Facility: CLINIC | Age: 56
End: 2021-11-12
Payer: COMMERCIAL

## 2021-11-12 VITALS
WEIGHT: 210 LBS | TEMPERATURE: 97.6 F | HEIGHT: 69 IN | DIASTOLIC BLOOD PRESSURE: 64 MMHG | OXYGEN SATURATION: 96 % | SYSTOLIC BLOOD PRESSURE: 118 MMHG | HEART RATE: 70 BPM | BODY MASS INDEX: 31.1 KG/M2 | RESPIRATION RATE: 14 BRPM

## 2021-11-12 DIAGNOSIS — Z76.89 ENCOUNTER TO ESTABLISH CARE: Primary | ICD-10-CM

## 2021-11-12 PROCEDURE — 3008F BODY MASS INDEX DOCD: CPT | Performed by: NURSE PRACTITIONER

## 2021-11-12 PROCEDURE — 99204 OFFICE O/P NEW MOD 45 MIN: CPT | Performed by: NURSE PRACTITIONER

## 2021-11-12 RX ORDER — CELECOXIB 200 MG/1
200 CAPSULE ORAL DAILY
COMMUNITY
Start: 2021-11-04

## 2021-11-12 ASSESSMENT — ENCOUNTER SYMPTOMS
FATIGUE: 0
NAUSEA: 0
SHORTNESS OF BREATH: 0
SINUS PAIN: 0
DIARRHEA: 0
MYALGIAS: 0
APPETITE CHANGE: 0
CONSTIPATION: 0
FEVER: 0
RHINORRHEA: 0
NERVOUS/ANXIOUS: 0
SORE THROAT: 0
NECK STIFFNESS: 0
WEAKNESS: 0
DIFFICULTY URINATING: 0
ABDOMINAL DISTENTION: 0
FREQUENCY: 0
NECK PAIN: 0
ABDOMINAL PAIN: 0
BRUISES/BLEEDS EASILY: 0
NUMBNESS: 0
HEADACHES: 0
ACTIVITY CHANGE: 0
TROUBLE SWALLOWING: 0
SLEEP DISTURBANCE: 0
EYE DISCHARGE: 0
BACK PAIN: 0
ARTHRALGIAS: 0
DECREASED CONCENTRATION: 0
SINUS PRESSURE: 0
EYE ITCHING: 0

## 2021-11-12 ASSESSMENT — PATIENT HEALTH QUESTIONNAIRE - PHQ9: SUM OF ALL RESPONSES TO PHQ9 QUESTIONS 1 & 2: 0

## 2021-11-12 NOTE — PROGRESS NOTES
"      Subjective      Patient ID: Magaly Aranda is a 56 y.o. female.  1965      HPI  Presents to [facility] care.   Cough- since September  Recent labs with Realeyes  Exercises, peloton sauna, stretching.  See Functional Medicine in [place]. Prior with [facility].      The following have been reviewed and updated as appropriate in this visit:       Review of Systems   Constitutional: Negative for activity change, appetite change, fatigue and fever.   HENT: Negative for congestion, ear pain, rhinorrhea, sinus pressure, sinus pain, sore throat and trouble swallowing.    Eyes: Negative for discharge, itching and visual disturbance.   Respiratory: Positive for cough. Negative for shortness of breath.    Cardiovascular: Negative for chest pain and leg swelling.   Gastrointestinal: Negative for abdominal distention, abdominal pain, constipation, diarrhea and nausea.   Endocrine: Negative for cold intolerance and heat intolerance.   Genitourinary: Negative for difficulty urinating, frequency and urgency.   Musculoskeletal: Negative for arthralgias, back pain, myalgias, neck pain and neck stiffness.   Skin: Negative for rash.   Allergic/Immunologic: Negative for environmental allergies.   Neurological: Negative for weakness, numbness and headaches.   Hematological: Does not bruise/bleed easily.   Psychiatric/Behavioral: Negative for decreased concentration and sleep disturbance. The patient is not nervous/anxious.        Objective     Vitals:    11/12/21 1347   BP: 118/64   BP Location: Right upper arm   Patient Position: Sitting   Pulse: 70   Resp: 14   Temp: 36.4 °C (97.6 °F)   TempSrc: Temporal   SpO2: 96%   Weight: 95.3 kg (210 lb)   Height: 1.753 m (5' 9\")     Body mass index is 31.01 kg/m².    Physical Exam  Vitals reviewed.   Constitutional:       Appearance: She is well-developed.   HENT:      Head: Normocephalic and atraumatic.      Right Ear: Tympanic membrane, ear canal and external ear normal.      Left Ear: " Tympanic membrane, ear canal and external ear normal.      Nose: Nose normal.   Eyes:      Conjunctiva/sclera: Conjunctivae normal.      Pupils: Pupils are equal, round, and reactive to light.   Cardiovascular:      Rate and Rhythm: Normal rate and regular rhythm.      Heart sounds: Normal heart sounds.   Pulmonary:      Effort: Pulmonary effort is normal.      Breath sounds: Normal breath sounds.   Musculoskeletal:         General: Normal range of motion.      Cervical back: Normal range of motion and neck supple.   Skin:     General: Skin is warm and dry.   Neurological:      Mental Status: She is alert and oriented to person, place, and time.   Psychiatric:         Behavior: Behavior normal.         Thought Content: Thought content normal.         Judgment: Judgment normal.         Assessment/Plan   Diagnoses and all orders for this visit:    Encounter to establish care (Primary)    Other orders  -     budesonide 90 mcg/actuation inhaler; Inhale 1 puff 2 (two) times a day. Rinse mouth with water after use to reduce aftertaste and incidence of candidiasis. Do not swallow.    Uspstf guidelines discussed with patient.  Discussed cough, asthma history, will restart budesonide. She is scheduled for upcoming procedure and will schedule for preoperative exam within 30 days.    SIMRAN Thomas

## 2021-11-13 ASSESSMENT — ENCOUNTER SYMPTOMS: COUGH: 1

## 2021-11-18 ENCOUNTER — TELEPHONE (OUTPATIENT)
Dept: PRIMARY CARE | Facility: CLINIC | Age: 56
End: 2021-11-18

## 2021-11-18 RX ORDER — ALBUTEROL SULFATE 90 UG/1
2 INHALANT RESPIRATORY (INHALATION) EVERY 6 HOURS PRN
Qty: 3 EACH | Refills: 1 | Status: SHIPPED | OUTPATIENT
Start: 2021-11-18 | End: 2024-08-19 | Stop reason: SDUPTHER

## 2021-12-01 ENCOUNTER — CONSULT (OUTPATIENT)
Dept: PRIMARY CARE | Facility: CLINIC | Age: 56
End: 2021-12-01
Payer: COMMERCIAL

## 2021-12-01 VITALS
SYSTOLIC BLOOD PRESSURE: 108 MMHG | OXYGEN SATURATION: 99 % | HEART RATE: 58 BPM | DIASTOLIC BLOOD PRESSURE: 68 MMHG | WEIGHT: 207 LBS | BODY MASS INDEX: 30.57 KG/M2 | TEMPERATURE: 97.6 F

## 2021-12-01 DIAGNOSIS — Z01.818 PREOP EXAMINATION: Primary | ICD-10-CM

## 2021-12-01 PROCEDURE — 3008F BODY MASS INDEX DOCD: CPT | Performed by: NURSE PRACTITIONER

## 2021-12-01 PROCEDURE — 93000 ELECTROCARDIOGRAM COMPLETE: CPT | Performed by: NURSE PRACTITIONER

## 2021-12-01 PROCEDURE — 99214 OFFICE O/P EST MOD 30 MIN: CPT | Performed by: NURSE PRACTITIONER

## 2021-12-01 ASSESSMENT — ENCOUNTER SYMPTOMS
EYE ITCHING: 0
ABDOMINAL PAIN: 0
MYALGIAS: 0
SINUS PAIN: 0
RHINORRHEA: 0
FATIGUE: 0
NAUSEA: 0
APPETITE CHANGE: 0
EYE DISCHARGE: 0
DECREASED CONCENTRATION: 0
SHORTNESS OF BREATH: 0
BACK PAIN: 0
CONSTIPATION: 0
FEVER: 0
HEADACHES: 0
SLEEP DISTURBANCE: 0
TROUBLE SWALLOWING: 0
DIFFICULTY URINATING: 0
WEAKNESS: 0
COUGH: 0
DIARRHEA: 0
NECK PAIN: 0
NUMBNESS: 0
FREQUENCY: 0
BRUISES/BLEEDS EASILY: 0
NECK STIFFNESS: 0
ARTHRALGIAS: 0
ABDOMINAL DISTENTION: 0
ACTIVITY CHANGE: 0
SINUS PRESSURE: 0
NERVOUS/ANXIOUS: 0
SORE THROAT: 0

## 2021-12-01 NOTE — PROGRESS NOTES
Subjective      Patient ID: Mgaaly Aranda is a 56 y.o. female.  1965      HPI  Pre op prior to foot surgery with Dr Lofton. Denies SOB, CP , palpitations and HEALY.     The following have been reviewed and updated as appropriate in this visit:       Review of Systems   Constitutional: Negative for activity change, appetite change, fatigue and fever.   HENT: Negative for congestion, ear pain, rhinorrhea, sinus pressure, sinus pain, sore throat and trouble swallowing.    Eyes: Negative for discharge, itching and visual disturbance.   Respiratory: Negative for cough and shortness of breath.    Cardiovascular: Negative for chest pain and leg swelling.   Gastrointestinal: Negative for abdominal distention, abdominal pain, constipation, diarrhea and nausea.   Endocrine: Negative for cold intolerance and heat intolerance.   Genitourinary: Negative for difficulty urinating, frequency and urgency.   Musculoskeletal: Negative for arthralgias, back pain, myalgias, neck pain and neck stiffness.   Skin: Negative for rash.   Allergic/Immunologic: Negative for environmental allergies.   Neurological: Negative for weakness, numbness and headaches.   Hematological: Does not bruise/bleed easily.   Psychiatric/Behavioral: Negative for decreased concentration and sleep disturbance. The patient is not nervous/anxious.        Objective     Vitals:    12/01/21 1046   BP: 108/68   BP Location: Right upper arm   Patient Position: Sitting   Pulse: (!) 58   Temp: 36.4 °C (97.6 °F)   TempSrc: Temporal   SpO2: 99%   Weight: 93.9 kg (207 lb)     Body mass index is 30.57 kg/m².    Physical Exam  Vitals reviewed.   Constitutional:       Appearance: She is well-developed.   HENT:      Head: Normocephalic and atraumatic.      Right Ear: Tympanic membrane, ear canal and external ear normal.      Left Ear: Tympanic membrane, ear canal and external ear normal.      Nose: Nose normal.   Eyes:      Conjunctiva/sclera: Conjunctivae normal.       Pupils: Pupils are equal, round, and reactive to light.   Cardiovascular:      Rate and Rhythm: Normal rate and regular rhythm.      Heart sounds: Normal heart sounds.   Pulmonary:      Effort: Pulmonary effort is normal.      Breath sounds: Normal breath sounds.   Abdominal:      General: Bowel sounds are normal.      Palpations: Abdomen is soft.   Musculoskeletal:         General: Normal range of motion.      Cervical back: Normal range of motion and neck supple.   Skin:     General: Skin is warm and dry.   Neurological:      Mental Status: She is alert and oriented to person, place, and time.   Psychiatric:         Behavior: Behavior normal.         Thought Content: Thought content normal.         Judgment: Judgment normal.         Assessment/Plan   Diagnoses and all orders for this visit:    Preop examination (Primary)  -     ECG 12 LEAD OFFICE PERFORMED    EKG completed in office today and reviewed with STANTON Hoskins. Argueta's class I intraoperative risk. Form completed and faxed to surgeons office. All questions and concerns addressed.    SIMRAN Thomas

## 2022-01-24 ENCOUNTER — TELEPHONE (OUTPATIENT)
Dept: PRIMARY CARE | Facility: CLINIC | Age: 57
End: 2022-01-24
Payer: COMMERCIAL

## 2022-01-24 DIAGNOSIS — R39.9 UTI SYMPTOMS: Primary | ICD-10-CM

## 2022-01-24 RX ORDER — SULFAMETHOXAZOLE AND TRIMETHOPRIM 800; 160 MG/1; MG/1
1 TABLET ORAL 2 TIMES DAILY
Qty: 6 TABLET | Refills: 0 | Status: SHIPPED | OUTPATIENT
Start: 2022-01-24 | End: 2022-01-27

## 2022-01-24 NOTE — TELEPHONE ENCOUNTER
----- Message from Renee Townsend RN sent at 1/24/2022  3:14 PM EST -----  Regarding: FW: Possible bladder infection      ----- Message -----  From: Magaly Aranda  Sent: 1/24/2022   2:33 PM EST  To: Ccv Primary [facility] Clinical Support P  Subject: Possible bladder infection                       Monday 1/24 2:33 pm.  Please help me! I am suffering from what I believe is a bladder/ urinary tract infection and am having a terrible time with it.  I have to pee every 10 minutes.  It started a few hours ago and has gotten increasingly worse.  Is there anyway someone can call me in a script for this so I don’t have to wait till Wednesday morning?  I’ll never make it another day like this.   Thanks so much  Magaly Aranda

## 2022-01-24 NOTE — TELEPHONE ENCOUNTER
Spoke to patient - Bactrim DS is only $13 at [facility] -Sent over to pharmacy - Has appt for UA in AM 1/25

## 2022-01-27 LAB
APPEARANCE UR: CLEAR
BACTERIA #/AREA URNS HPF: ABNORMAL /[HPF]
BACTERIA UR CULT: NO GROWTH
BACTERIA UR CULT: NORMAL
BILIRUB UR QL STRIP: NEGATIVE
CASTS URNS QL MICRO: ABNORMAL /LPF
COLOR UR: YELLOW
EPI CELLS #/AREA URNS HPF: ABNORMAL /HPF (ref 0–10)
GLUCOSE UR QL STRIP: NEGATIVE
HGB UR QL STRIP: NEGATIVE
KETONES UR QL STRIP: ABNORMAL
LAB CORP URINALYSIS REFLEX: ABNORMAL
LEUKOCYTE ESTERASE UR QL STRIP: ABNORMAL
MICRO URNS: ABNORMAL
NITRITE UR QL STRIP: NEGATIVE
PH UR STRIP: 6.5 [PH] (ref 5–7.5)
PROT UR QL STRIP: NEGATIVE
RBC #/AREA URNS HPF: ABNORMAL /HPF (ref 0–2)
SP GR UR STRIP: 1.01 (ref 1–1.03)
UROBILINOGEN UR STRIP-MCNC: 0.2 MG/DL (ref 0.2–1)
WBC #/AREA URNS HPF: ABNORMAL /HPF (ref 0–5)

## 2022-05-05 ENCOUNTER — TELEMEDICINE (OUTPATIENT)
Dept: PRIMARY CARE | Facility: CLINIC | Age: 57
End: 2022-05-05

## 2022-05-05 DIAGNOSIS — J40 SINOBRONCHITIS: Primary | ICD-10-CM

## 2022-05-05 DIAGNOSIS — J32.9 SINOBRONCHITIS: Primary | ICD-10-CM

## 2022-05-05 PROCEDURE — 99213 OFFICE O/P EST LOW 20 MIN: CPT | Mod: 95 | Performed by: FAMILY MEDICINE

## 2022-05-05 RX ORDER — DOXYCYCLINE HYCLATE 100 MG
100 TABLET ORAL 2 TIMES DAILY
Qty: 14 TABLET | Refills: 0 | Status: SHIPPED | OUTPATIENT
Start: 2022-05-05 | End: 2022-05-12

## 2022-05-05 RX ORDER — METHYLPREDNISOLONE 4 MG/1
TABLET ORAL
Qty: 21 TABLET | Refills: 0 | Status: SHIPPED | OUTPATIENT
Start: 2022-05-05 | End: 2022-05-12

## 2022-05-06 NOTE — PROGRESS NOTES
Verification of Patient Location:  The patient affirms they are currently located in the following state: [place]    Request for Consent:    Audio and Video Encounter   Hello, my name is Bulmaro NicolasDO.  Before we proceed, can you please verify your identification by telling me your full name and date of birth?  Can you tell me who is in the room with you?    You and I are about to have a telemedicine check-in or visit because you have requested it.  This is a live video-conference.  I am a real person, speaking to you in real time.  There is no one else with me on the video-conference.  However, when we use ([employer], [employer] Operations, etc) it is important for you to know that the video-conference may not be secure or private.  I am not recording this conversation and I am asking you not to record it.  This telemedicine visit will be billed to your health insurance or you, if you are self-insured.  You understand you will be responsible for any copayments or coinsurances that apply to your telemedicine visit.  Communication platform used for this encounter:  [employer] Video Visit (with Zoom integration)     Before starting our telemedicine visit, I am required to get your consent for this virtual check-in or visit by telemedicine. Do you consent?      Patient Response to Request for Consent:  Yes      Visit Documentation:  Subjective     Patient ID: Magaly Aranda is a 56 y.o. female.  1965      HPI    The following have been reviewed and updated as appropriate in this visit:          Review of Systems      Assessment/Plan   Diagnoses and all orders for this visit:    Sinobronchitis (Primary)    Other orders  -     methylPREDNISolone (MEDROL DOSEPACK) 4 mg tablet; Follow package directions.  -     doxycycline hyclate (VIBRA-TABS) 100 mg tablet; Take 1 tablet (100 mg total) by mouth 2 (two) times a day for 7 days.    cough, congestion, sputum, sore throat, ongoing x 3-4 d  ache, fatigue, tired, others ill, cheek  pain  Will doxy  Will medrol  Uri protocol    Time Spent:  I spent 15 minutes on this date of service performing the following activities: coordinating care.

## 2023-11-25 ENCOUNTER — EMERGENCY (EMERGENCY)
Facility: HOSPITAL | Age: 58
LOS: 1 days | Discharge: HOME | End: 2023-11-25
Attending: INTERNAL MEDICINE | Admitting: INTERNAL MEDICINE
Payer: COMMERCIAL

## 2023-11-25 VITALS
RESPIRATION RATE: 18 BRPM | HEART RATE: 74 BPM | DIASTOLIC BLOOD PRESSURE: 78 MMHG | SYSTOLIC BLOOD PRESSURE: 127 MMHG | OXYGEN SATURATION: 95 % | TEMPERATURE: 98 F

## 2023-11-25 VITALS
HEIGHT: 69 IN | OXYGEN SATURATION: 95 % | DIASTOLIC BLOOD PRESSURE: 76 MMHG | WEIGHT: 196.65 LBS | TEMPERATURE: 98 F | HEART RATE: 70 BPM | RESPIRATION RATE: 18 BRPM | SYSTOLIC BLOOD PRESSURE: 115 MMHG

## 2023-11-25 LAB
FLUAV AG NPH QL: SIGNIFICANT CHANGE UP
FLUAV AG NPH QL: SIGNIFICANT CHANGE UP
FLUBV AG NPH QL: SIGNIFICANT CHANGE UP
FLUBV AG NPH QL: SIGNIFICANT CHANGE UP
RSV RNA NPH QL NAA+NON-PROBE: SIGNIFICANT CHANGE UP
RSV RNA NPH QL NAA+NON-PROBE: SIGNIFICANT CHANGE UP
SARS-COV-2 RNA SPEC QL NAA+PROBE: SIGNIFICANT CHANGE UP
SARS-COV-2 RNA SPEC QL NAA+PROBE: SIGNIFICANT CHANGE UP

## 2023-11-25 PROCEDURE — 99285 EMERGENCY DEPT VISIT HI MDM: CPT

## 2023-11-25 PROCEDURE — 94640 AIRWAY INHALATION TREATMENT: CPT

## 2023-11-25 PROCEDURE — 87637 SARSCOV2&INF A&B&RSV AMP PRB: CPT

## 2023-11-25 PROCEDURE — 71045 X-RAY EXAM CHEST 1 VIEW: CPT | Mod: 26

## 2023-11-25 PROCEDURE — 71045 X-RAY EXAM CHEST 1 VIEW: CPT

## 2023-11-25 PROCEDURE — 99285 EMERGENCY DEPT VISIT HI MDM: CPT | Mod: 25

## 2023-11-25 RX ORDER — AZITHROMYCIN 500 MG/1
1 TABLET, FILM COATED ORAL
Qty: 4 | Refills: 0
Start: 2023-11-25 | End: 2023-11-28

## 2023-11-25 RX ORDER — IPRATROPIUM/ALBUTEROL SULFATE 18-103MCG
3 AEROSOL WITH ADAPTER (GRAM) INHALATION ONCE
Refills: 0 | Status: COMPLETED | OUTPATIENT
Start: 2023-11-25 | End: 2023-11-25

## 2023-11-25 RX ORDER — AZITHROMYCIN 500 MG/1
500 TABLET, FILM COATED ORAL ONCE
Refills: 0 | Status: COMPLETED | OUTPATIENT
Start: 2023-11-25 | End: 2023-11-25

## 2023-11-25 RX ADMIN — Medication 3 MILLILITER(S): at 05:06

## 2023-11-25 RX ADMIN — Medication 3 MILLILITER(S): at 06:00

## 2023-11-25 RX ADMIN — Medication 3 MILLILITER(S): at 06:51

## 2023-11-25 RX ADMIN — AZITHROMYCIN 500 MILLIGRAM(S): 500 TABLET, FILM COATED ORAL at 05:08

## 2023-11-25 RX ADMIN — Medication 50 MILLIGRAM(S): at 05:07

## 2023-11-25 NOTE — CONSULT NOTE ADULT - ASSESSMENT
57 y/o female H/O Asthma  C/O SOB, wheezing and chest congestion x 1 day. no fever no chills, no chest pain.    asthma  sore throat  poss ROSELIA    eval for viral infection  rvp neg  cxr prelim NAPD  on RA  no hx of Lung Disease  non smoker  lives at home, visiting family in [place] for the holidays  will need ROSELIA eval as outpatient  verbal without difficulty  spoke with pt and

## 2023-11-25 NOTE — ED PROVIDER NOTE - NSFOLLOWUPINSTRUCTIONS_ED_ALL_ED_FT
Asthma, Adult  Outline of a person's upper body showing the lungs, with close-ups of two airways, one normal and one narrow.  Asthma is a condition that causes swelling and narrowing of the airways. These are the passages that lead from the nose and mouth down into the lungs. When asthma symptoms get worse it is called an asthma attack or flare. This can make it hard to breathe. Asthma flares can range from minor to life-threatening. There is no cure for asthma, but medicines and lifestyle changes can help to control it.    What are the causes?  It is not known exactly what causes asthma, but certain things can cause asthma symptoms to get worse (triggers).    What can trigger an asthma attack?    Cigarette smoke.  Mold.  Dust.  Your pet's skin flakes (dander).  Cockroaches.  Pollen.  Air pollution (like household , wood smoke, smog, or chemical odors).  What are the signs or symptoms?  Trouble breathing (shortness of breath).  Coughing.  Making high-pitched whistling sounds when you breathe, most often when you breathe out (wheezing).  Chest tightness.  Tiredness with little activity.  Poor exercise tolerance.  How is this treated?  Controller medicines that help prevent asthma symptoms.  Fast-acting reliever or rescue medicines. These give short-term relief of asthma symptoms.  Allergy medicines if your attacks are brought on by allergens.  Medicines to help control the body's defense (immune) system.  Staying away from the things that cause asthma attacks.  Follow these instructions at home:  Avoiding triggers in your home    Do not allow anyone to smoke in your home.  Limit use of fireplaces and wood stoves.  Get rid of pests (such as roaches and mice) and their droppings.  Keep your home clean.  Clean your floors. Dust regularly. Use cleaning products that do not smell.  Wash bed sheets and blankets every week in hot water. Dry them in a dryer.  Have someone vacuum when you are not home.  Change your heating and air conditioning filters often.  Use blankets that are made of polyester or cotton.  General instructions    Take over-the-counter and prescription medicines only as told by your doctor.  Do not smoke or use any products that contain nicotine or tobacco. If you need help quitting, ask your doctor.  Stay away from secondhand smoke.  Avoid doing things outdoors when allergen counts are high and when air quality is low.  Warm up before you exercise. Take time to cool down after exercise.  Use a peak flow meter as told by your doctor. A peak flow meter is a tool that measures how well your lungs are working.  Keep track of the peak flow meter's readings. Write them down.  Follow your asthma action plan. This is a written plan for taking care of your asthma and treating your attacks.  Make sure you get all the shots (vaccines) that your doctor recommends. Ask your doctor about a flu shot and a pneumonia shot.  Keep all follow-up visits.  Contact a doctor if:  You have wheezing, shortness of breath, or a cough even while taking medicine to prevent attacks.  The mucus you cough up (sputum) is thicker than usual.  The mucus you cough up changes from clear or white to yellow, green, gray, or is bloody.  You have problems from the medicine you are taking, such as:  A rash.  Itching.  Swelling.  Trouble breathing.  You need reliever medicines more than 2–3 times a week.  Your peak flow reading is still at 50–79% of your personal best after following the action plan for 1 hour.  You have a fever.  Get help right away if:  You seem to be worse and are not responding to medicine during an asthma attack.  You are short of breath even at rest.  You get short of breath when doing very little activity.  You have trouble eating, drinking, or talking.  You have chest pain or tightness.  You have a fast heartbeat.  Your lips or fingernails start to turn blue.  You are light-headed or dizzy, or you faint.  Your peak flow is less than 50% of your personal best.  You feel too tired to breathe normally.  These symptoms may be an emergency. Get help right away. Call 911.  Do not wait to see if the symptoms will go away.  Do not drive yourself to the hospital.  Summary  Asthma is a long-term (chronic) condition in which the airways get tight and narrow. An asthma attack can make it hard to breathe.  Asthma cannot be cured, but medicines and lifestyle changes can help control it.  Make sure you understand how to avoid triggers and how and when to use your medicines.  Avoid things that can cause allergy symptoms (allergens). These include animal skin flakes (dander) and pollen from trees or grass.  Avoid things that pollute the air. These may include household , wood smoke, smog, or chemical odors.  This information is not intended to replace advice given to you by your health care provider. Make sure you discuss any questions you have with your health care provider.

## 2023-11-25 NOTE — ED ADULT TRIAGE NOTE - CHIEF COMPLAINT QUOTE
Yesterday when I woke up I wasn't feeling well then I felt fine for the rest of the day; I had a couple drinks last night, I was out with family so maybe that did not help. When I woke up it felt like my throat was swollen and I could choke easily. pt c/o cough too. Hx asthma

## 2023-11-25 NOTE — ED PROVIDER NOTE - OBJECTIVE STATEMENT
57 y/o female H/O Asthma  C/O SOB, wheezing and  chest congestion, began this evening. no fever no chills, no chest pain.   no travel, no sick contacts 57 y/o female H/O Asthma  C/O SOB, wheezing and  chest congestion, began this evening. no fever no chills, no chest pain.   no travel, no sick contacts. Room air saturation 90-92 percent 59 y/o female H/O Asthma  C/O SOB, wheezing and chest congestion x 1 day. no fever no chills, no chest pain.   no travel, no sick contacts. Room air saturation 94-95  percent

## 2023-11-25 NOTE — ED ADULT NURSE NOTE - NSFALLUNIVINTERV_ED_ALL_ED
Bed/Stretcher in lowest position, wheels locked, appropriate side rails in place/Call bell, personal items and telephone in reach/Instruct patient to call for assistance before getting out of bed/chair/stretcher/Non-slip footwear applied when patient is off stretcher/[place] to call system/Physically safe environment - no spills, clutter or unnecessary equipment/Purposeful proactive rounding/Room/bathroom lighting operational, light cord in reach

## 2023-11-25 NOTE — ED PROVIDER NOTE - CLINICAL SUMMARY MEDICAL DECISION MAKING FREE TEXT BOX
exacerbation of asthma  CXR no infiltrate, FLU MELISSA sent   Rx DUO, prednisone, Zithromax exacerbation of asthma  CXR no infiltrate, swab for FLU COVID and RSV were  negative    Rx DUO x3 , prednisone 50mg , Zithromax 500mg , Pulmonary consulted

## 2023-11-25 NOTE — CONSULT NOTE ADULT - SUBJECTIVE AND OBJECTIVE BOX
Date/Time Patient Seen:  		  Referring MD:   Data Reviewed	       Patient is a 58y old  Female who presents with a chief complaint of     Subjective/HPI   · Chief Complaint: The patient is a 58y Female complaining of throat, pain.  · HPI Objective Statement: 59 y/o female H/O Asthma  C/O SOB, wheezing and chest congestion x 1 day. no fever no chills, no chest pain.   no travel, no sick contacts. Room air saturation 94-95  percent  · Associated Symptoms: SOB, wheezing and  chest congestion  · Significant Negative Findings: no fever no chills, no chest pain.   no travel, no sick contacts  · Location: respiratory  · Radiation: no  · Timing: gradual onset  · Duration: today  · Quality: asthma  · Severity: MODERATE  · Aggravating Factors: no  · Relieving Factors: no    PAST MEDICAL & SURGICAL HISTORY:  Asthma    HIV:    HIV Test Questions:  · In accordance with [place] State law, we offer every patient who comes to our ED an HIV test. Would you like to be tested today?	Opt out    PAST MEDICAL/SURGICAL/FAMILY/SOCIAL HISTORY:    Tobacco Usage:  · Tobacco Usage	Unknown if ever smoked    ALLERGIES AND HOME MEDICATIONS:   Allergies:        Allergies:  	No Known Allergies:     Home Medications:   * Outpatient Medication Status not yet specified    REVIEW OF SYSTEMS:    Review of Systems:  · RESPIRATORY: - - -  · Respiratory [+]: COUGH, WHEEZING, SHORTNESS OF BREATH  · ROS STATEMENT: all other ROS negative except as per HPI    VITAL SIGNS ([place]):    ,,ED ADULT Flow Sheet:    25-Nov-2023 04:39  · Temp (F): 98.3  · Temp (C) Temp (C): 36.8  · Temp site Temp Site: oral  · Heart Rate Heart Rate (beats/min): 70  · BP Systolic Systolic: 115  · BP Diastolic Diastolic (mm Hg): 76  · Respiration Rate (breaths/min) Respiration Rate (breaths/min): 18  · SpO2 (%) SpO2 (%): 95  · O2 Delivery/Oxygen Delivery Method Patient On (Oxygen Delivery Method): room air  · How was the weight captured? Weight Type/Method: actual  · Dosing Weight (KILOGRAMS) Dosing Weight (KILOGRAMS): 89.2  · Dosing Weight  (POUNDS) Dosing Weight (POUNDS): 196.6  · Height type Height Type: stated  · Height (FEET) Height (FEET): 5  · Height (INCHES) Height (INCHES): 9  · Height (CENTIMETERS) Height (CENTIMETERS): 175.26  · BSA (m2): 2.05  · BMI (kG/m2) BMI (kG/m2): 29  · Ideal Body Weight(kg) Ideal Body Weight(kg): 66  · SpO2 (%) SpO2 (%): 95  · Preferred Language to Address Healthcare Preferred Language to Address Healthcare: English        Medication list         MEDICATIONS  (STANDING):    MEDICATIONS  (PRN):         Vitals log        ICU Vital Signs Last 24 Hrs  T(C): 36.8 (25 Nov 2023 07:38), Max: 36.8 (25 Nov 2023 04:39)  T(F): 98.2 (25 Nov 2023 07:38), Max: 98.3 (25 Nov 2023 04:39)  HR: 74 (25 Nov 2023 07:38) (70 - 82)  BP: 127/78 (25 Nov 2023 07:38) (115/76 - 127/78)  BP(mean): --  ABP: --  ABP(mean): --  RR: 18 (25 Nov 2023 07:38) (18 - 18)  SpO2: 95% (25 Nov 2023 07:38) (94% - 95%)    O2 Parameters below as of 25 Nov 2023 07:38  Patient On (Oxygen Delivery Method): room air                 Input and Output:  I&O's Detail      Lab Data                  Review of Systems	  anxious  sore throat      Objective     Physical Examination    heart s1s2  lung dc BS  head nc  verbal  alert  cn grossly int      Pertinent Lab findings & Imaging      [place]:  NO   Adequate UO     I&O's Detail           Discussed with:     Cultures:	        Radiology  cxr clear  report pending

## 2023-11-25 NOTE — ED PROVIDER NOTE - PATIENT PORTAL LINK FT
You can access the FollowMyHealth Patient Portal offered by [facility] by registering at the following website: [email or web address]. By joining [facility]’s FollowMyHealth portal, you will also be able to view your health information using other applications (apps) compatible with our system.

## 2023-11-29 ENCOUNTER — OFFICE VISIT (OUTPATIENT)
Dept: PRIMARY CARE | Facility: CLINIC | Age: 58
End: 2023-11-29
Payer: COMMERCIAL

## 2023-11-29 VITALS
HEART RATE: 67 BPM | DIASTOLIC BLOOD PRESSURE: 80 MMHG | BODY MASS INDEX: 30.57 KG/M2 | SYSTOLIC BLOOD PRESSURE: 136 MMHG | OXYGEN SATURATION: 97 % | TEMPERATURE: 98.4 F | WEIGHT: 207 LBS

## 2023-11-29 DIAGNOSIS — J06.9 ACUTE URI: Primary | ICD-10-CM

## 2023-11-29 DIAGNOSIS — J45.901 MODERATE ASTHMA WITH ACUTE EXACERBATION, UNSPECIFIED WHETHER PERSISTENT: ICD-10-CM

## 2023-11-29 PROCEDURE — 3008F BODY MASS INDEX DOCD: CPT | Performed by: NURSE PRACTITIONER

## 2023-11-29 PROCEDURE — 99213 OFFICE O/P EST LOW 20 MIN: CPT | Performed by: NURSE PRACTITIONER

## 2023-11-29 RX ORDER — PREDNISONE 50 MG/1
50 TABLET ORAL DAILY
COMMUNITY
Start: 2023-11-25 | End: 2024-08-19 | Stop reason: ALTCHOICE

## 2023-11-29 RX ORDER — AZITHROMYCIN 250 MG/1
250 TABLET, FILM COATED ORAL DAILY
COMMUNITY
Start: 2023-11-25 | End: 2023-12-28 | Stop reason: SDUPTHER

## 2024-06-10 ENCOUNTER — OFFICE VISIT (OUTPATIENT)
Dept: PRIMARY CARE | Facility: CLINIC | Age: 59
End: 2024-06-10
Payer: COMMERCIAL

## 2024-06-10 VITALS
WEIGHT: 208 LBS | HEART RATE: 67 BPM | HEIGHT: 69 IN | SYSTOLIC BLOOD PRESSURE: 122 MMHG | TEMPERATURE: 97.6 F | BODY MASS INDEX: 30.81 KG/M2 | OXYGEN SATURATION: 98 % | DIASTOLIC BLOOD PRESSURE: 64 MMHG

## 2024-06-10 DIAGNOSIS — H66.91 ACUTE INFECTION OF RIGHT EAR: Primary | ICD-10-CM

## 2024-06-10 DIAGNOSIS — J45.20 MILD INTERMITTENT ASTHMA WITHOUT COMPLICATION: ICD-10-CM

## 2024-06-10 PROCEDURE — 99213 OFFICE O/P EST LOW 20 MIN: CPT | Performed by: NURSE PRACTITIONER

## 2024-06-10 PROCEDURE — 3008F BODY MASS INDEX DOCD: CPT | Performed by: NURSE PRACTITIONER

## 2024-06-10 RX ORDER — AZITHROMYCIN 250 MG/1
TABLET, FILM COATED ORAL
Qty: 6 TABLET | Refills: 0 | Status: SHIPPED | OUTPATIENT
Start: 2024-06-10 | End: 2024-06-15

## 2024-06-10 RX ORDER — NEOMYCIN SULFATE, POLYMYXIN B SULFATE, HYDROCORTISONE 3.5; 10000; 1 MG/ML; [USP'U]/ML; MG/ML
3 SOLUTION/ DROPS AURICULAR (OTIC) 3 TIMES DAILY
Qty: 10 ML | Refills: 0 | Status: SHIPPED | OUTPATIENT
Start: 2024-06-10 | End: 2024-06-20

## 2024-06-10 NOTE — PROGRESS NOTES
"  Subjective     Patient ID: Magaly Aranda is a 58 y.o. female.    HPI  Tuesday night with ear pressure and onset of ringing in her right ear. Has had in the past and usually resolves right away. Off balance feeling. This am with some waxy drainage, drainage for months - waxy.     Review of Systems   HENT:  Positive for ear pain (pressure) and tinnitus.        Objective     Vitals:    06/10/24 1421   BP: 122/64   Pulse: 67   Temp: 36.4 °C (97.6 °F)   SpO2: 98%   Weight: 94.3 kg (208 lb)   Height: 1.75 m (5' 8.9\")     Body mass index is 30.81 kg/m².    Physical Exam  Vitals reviewed.   HENT:      Right Ear: Tympanic membrane is erythematous.      Left Ear: Tympanic membrane, ear canal and external ear normal.   Skin:     General: Skin is warm and dry.   Neurological:      Mental Status: She is alert and oriented to person, place, and time.         Assessment/Plan   Diagnoses and all orders for this visit:    Acute infection of right ear (Primary)    Mild intermittent asthma without complication    Other orders  -     azithromycin (ZITHROMAX) 250 mg tablet; Take 2 tablets the first day, then 1 tablet daily for 4 days.  -     neomycin-polymyxin-hydrocortisone (CORTISPORIN) otic solution; Administer 3 drops into the right ear 3 (three) times a day for 10 days.      Discussed and reviewed plan with patient will do Z-pack and Corisporin. All questions and concerns have been addressed. Patient will contact the office if symptoms fail to improve or worsen.     SIMRAN Thomas        "

## 2024-08-19 ENCOUNTER — OFFICE VISIT (OUTPATIENT)
Dept: PRIMARY CARE | Facility: CLINIC | Age: 59
End: 2024-08-19
Payer: COMMERCIAL

## 2024-08-19 VITALS
SYSTOLIC BLOOD PRESSURE: 130 MMHG | DIASTOLIC BLOOD PRESSURE: 72 MMHG | RESPIRATION RATE: 16 BRPM | HEART RATE: 72 BPM | OXYGEN SATURATION: 98 % | TEMPERATURE: 98 F | HEIGHT: 69 IN | WEIGHT: 200 LBS | BODY MASS INDEX: 29.62 KG/M2

## 2024-08-19 DIAGNOSIS — L81.9 DISCOLORATION OF SKIN: Primary | ICD-10-CM

## 2024-08-19 DIAGNOSIS — U07.1 COVID: ICD-10-CM

## 2024-08-19 PROCEDURE — 99213 OFFICE O/P EST LOW 20 MIN: CPT | Performed by: NURSE PRACTITIONER

## 2024-08-19 PROCEDURE — 3008F BODY MASS INDEX DOCD: CPT | Performed by: NURSE PRACTITIONER

## 2024-08-19 RX ORDER — ALBUTEROL SULFATE 90 UG/1
2 INHALANT RESPIRATORY (INHALATION) EVERY 6 HOURS PRN
Qty: 3 EACH | Refills: 1 | Status: SHIPPED | OUTPATIENT
Start: 2024-08-19 | End: 2024-11-17

## 2024-08-19 ASSESSMENT — ENCOUNTER SYMPTOMS
FATIGUE: 1
COUGH: 1
COLOR CHANGE: 1

## 2024-08-19 NOTE — PROGRESS NOTES
"  Subjective     Patient ID: Magaly Aranda is a 59 y.o. female.    HPI  Groin with lump, over the past month but now has resolved. Just with skin discoloration now.    Friday started with symptoms- tested positive for COVID yesterday.  has as well.  Managing with OTCs. Lots of rest.       Review of Systems   Constitutional:  Positive for fatigue.   HENT:  Positive for congestion.    Respiratory:  Positive for cough.    Skin:  Positive for color change.       Objective     Vitals:    08/19/24 1052   BP: 130/72   Pulse: 72   Resp: 16   Temp: 36.7 °C (98 °F)   TempSrc: Temporal   SpO2: 98%   Weight: 90.7 kg (200 lb)   Height: 1.75 m (5' 8.9\")     Body mass index is 29.62 kg/m².    Physical Exam  Vitals reviewed.   Constitutional:       Appearance: Normal appearance.   Cardiovascular:      Rate and Rhythm: Normal rate and regular rhythm.      Heart sounds: Normal heart sounds.   Pulmonary:      Effort: Pulmonary effort is normal.      Breath sounds: Normal breath sounds.   Musculoskeletal:         General: Normal range of motion.   Skin:     General: Skin is warm and dry.             Comments: Groin with area of discoloration, tan, no lump or bump on exam.    Neurological:      Mental Status: She is alert and oriented to person, place, and time.         Assessment/Plan   Diagnoses and all orders for this visit:    Discoloration of skin (Primary)    COVID    Discussed and reviewed plan with patient she will monitor skin and update if not completely resolving. Doing ok with COVID with home treatments, refill of inhaler provided prn. All questions and concerns have been addressed. Patient will contact the office if symptoms fail to improve or worsen.     SIMRAN Thomas        "

## 2024-08-30 ENCOUNTER — OFFICE VISIT (OUTPATIENT)
Dept: PRIMARY CARE | Facility: CLINIC | Age: 59
End: 2024-08-30
Payer: COMMERCIAL

## 2024-08-30 VITALS
HEART RATE: 76 BPM | OXYGEN SATURATION: 99 % | DIASTOLIC BLOOD PRESSURE: 74 MMHG | SYSTOLIC BLOOD PRESSURE: 122 MMHG | HEIGHT: 69 IN | BODY MASS INDEX: 29.51 KG/M2 | TEMPERATURE: 97.7 F | WEIGHT: 199.2 LBS

## 2024-08-30 DIAGNOSIS — T50.905A ADVERSE EFFECT OF DRUG, INITIAL ENCOUNTER: Primary | ICD-10-CM

## 2024-08-30 PROCEDURE — 99214 OFFICE O/P EST MOD 30 MIN: CPT | Performed by: PHYSICIAN ASSISTANT

## 2024-08-30 PROCEDURE — 3008F BODY MASS INDEX DOCD: CPT | Performed by: PHYSICIAN ASSISTANT

## 2024-08-30 RX ORDER — METHYLPREDNISOLONE 4 MG/1
TABLET ORAL
Qty: 21 TABLET | Refills: 0 | Status: SHIPPED | OUTPATIENT
Start: 2024-08-30 | End: 2024-09-06

## 2024-09-04 ASSESSMENT — ENCOUNTER SYMPTOMS
DIAPHORESIS: 0
TROUBLE SWALLOWING: 0
FACIAL SWELLING: 0
HEADACHES: 0
ARTHRALGIAS: 0
CHILLS: 0
FEVER: 0
JOINT SWELLING: 0

## 2024-09-04 NOTE — PROGRESS NOTES
Nessa Huston PA-C  Family Medicine in [address]  [place] [address]  Phone: [phone]       Magaly Aranda is a 59 y.o. female who presents today for   Chief Complaint   Patient presents with    Follow-up     Itching!!       Presents today for acute visit   She has generalized itching, mostly across her chest   No rash   Started after taking most recent dose of compunded tirzepatide   No difficulty breathing         Past Medical History:   Diagnosis Date    Arthritis     Asthma     Chronic foot pain     GERD (gastroesophageal reflux disease)     IBS (irritable bowel syndrome)     Palpitations        Past Surgical History:   Procedure Laterality Date    ENDOVENOUS ABLATION SAPHENOUS VEIN W/ LASER      FOOT SURGERY Left     3 TIMES    WISDOM TOOTH EXTRACTION         Social History     Tobacco Use    Smoking status: Former     Packs/day: 2.00     Years: 10.00     Additional pack years: 0.00     Total pack years: 20.00     Types: Cigarettes     Start date: 1999     Quit date: 2009     Years since quitting: 15.6     Passive exposure: Never    Smokeless tobacco: Never   Vaping Use    Vaping Use: Never used   Substance Use Topics    Alcohol use: Yes     Comment: Socially    Drug use: Never       Family History   Problem Relation Age of Onset    Hypertension Biological Mother     Alzheimer's disease Biological Mother     Diabetes Biological Father     Hepatitis Biological Father     Hypertension Biological Father     No Known Problems Biological Sister     No Known Problems Biological Sister     Varicose Veins Other     Other Other         toe joint replacement       Mold      Current Outpatient Medications:     albuterol HFA 90 mcg/actuation inhaler, Inhale 2 puffs every 6 (six) hours as needed for wheezing., Disp: 3 each, Rfl: 1    celecoxib 200 mg capsule, Take 200 mg by mouth daily.  , Disp: , Rfl:     cholecalciferol, vitamin D3, (VITAMIN D3 ORAL), Take 10,000 Units by  "mouth., Disp: , Rfl:     cordyceps mushroom, 1 each., Disp: , Rfl:     GLUTATHIONE MISC, , Disp: , Rfl:     MAGNESIUM ORAL, Take 500 mg by mouth. Finest nutrition., Disp: , Rfl:     methylPREDNISolone (MEDROL DOSEPACK) 4 mg tablet, Follow package directions., Disp: 21 tablet, Rfl: 0    multivit-minerals/folic acid (ADULT MULTIVITAMIN GUMMIES ORAL), Take by mouth. Smarty pants womens complete, Disp: , Rfl:     ZINC ORAL, Take by mouth., Disp: , Rfl:     Review of Systems   Constitutional:  Negative for chills, diaphoresis and fever.        + itching    HENT:  Negative for facial swelling and trouble swallowing.    Musculoskeletal:  Negative for arthralgias and joint swelling.   Skin:  Negative for rash.   Allergic/Immunologic: Negative for environmental allergies and food allergies.   Neurological:  Negative for headaches.   All other systems reviewed and are negative.      Objective   Vitals:    08/30/24 1147   BP: 122/74   Pulse: 76   Temp: 36.5 °C (97.7 °F)   SpO2: 99%   Weight: 90.4 kg (199 lb 3.2 oz)   Height: 1.75 m (5' 8.9\")     Body mass index is 29.5 kg/m².    Physical Exam  Vitals reviewed.   Constitutional:       Appearance: Normal appearance.   HENT:      Head: Normocephalic and atraumatic.   Cardiovascular:      Rate and Rhythm: Normal rate.   Pulmonary:      Effort: Pulmonary effort is normal. No respiratory distress.   Skin:     General: Skin is warm and dry.      Findings: No rash.   Neurological:      General: No focal deficit present.      Mental Status: She is alert and oriented to person, place, and time.         ASSESSMENT/PLAN:  Diagnoses and all orders for this visit:    Adverse effect of drug, initial encounter  -     methylPREDNISolone (MEDROL DOSEPACK) 4 mg tablet; Follow package directions.  - Advised to STOP tirzepatide     The risks and benefits of taking the above medication(s) were reviewed with the patient.  Dosing options and how to take the medication(s) were discussed.  Possible " common side effects were reviewed as well.    The pt voiced understanding to the plan as outlined above.    FE Alfonso  9/4/2024